# Patient Record
Sex: FEMALE | Race: BLACK OR AFRICAN AMERICAN | NOT HISPANIC OR LATINO
[De-identification: names, ages, dates, MRNs, and addresses within clinical notes are randomized per-mention and may not be internally consistent; named-entity substitution may affect disease eponyms.]

---

## 2018-03-23 ENCOUNTER — APPOINTMENT (OUTPATIENT)
Dept: OBGYN | Facility: CLINIC | Age: 63
End: 2018-03-23

## 2021-10-13 ENCOUNTER — INPATIENT (INPATIENT)
Facility: HOSPITAL | Age: 66
LOS: 9 days | Discharge: HOME | DRG: 345 | End: 2021-10-23
Payer: MEDICAID

## 2021-10-13 VITALS
WEIGHT: 190.04 LBS | OXYGEN SATURATION: 100 % | SYSTOLIC BLOOD PRESSURE: 132 MMHG | DIASTOLIC BLOOD PRESSURE: 84 MMHG | RESPIRATION RATE: 20 BRPM | HEART RATE: 87 BPM | TEMPERATURE: 98 F | HEIGHT: 64 IN

## 2021-10-13 DIAGNOSIS — Z96.652 PRESENCE OF LEFT ARTIFICIAL KNEE JOINT: Chronic | ICD-10-CM

## 2021-10-13 DIAGNOSIS — K56.41 FECAL IMPACTION: ICD-10-CM

## 2021-10-13 LAB
ALBUMIN SERPL ELPH-MCNC: 3.9 G/DL — SIGNIFICANT CHANGE UP (ref 3.3–5)
ALP SERPL-CCNC: 91 U/L — SIGNIFICANT CHANGE UP (ref 40–120)
ALT FLD-CCNC: 17 U/L — SIGNIFICANT CHANGE UP (ref 10–45)
ANION GAP SERPL CALC-SCNC: 17 MMOL/L — SIGNIFICANT CHANGE UP (ref 5–17)
APPEARANCE UR: CLEAR — SIGNIFICANT CHANGE UP
AST SERPL-CCNC: 12 U/L — SIGNIFICANT CHANGE UP (ref 10–40)
BACTERIA # UR AUTO: NEGATIVE — SIGNIFICANT CHANGE UP
BASE EXCESS BLDV CALC-SCNC: -0.7 MMOL/L — SIGNIFICANT CHANGE UP (ref -2–2)
BASOPHILS # BLD AUTO: 0.04 K/UL — SIGNIFICANT CHANGE UP (ref 0–0.2)
BASOPHILS NFR BLD AUTO: 0.3 % — SIGNIFICANT CHANGE UP (ref 0–2)
BILIRUB SERPL-MCNC: 0.3 MG/DL — SIGNIFICANT CHANGE UP (ref 0.2–1.2)
BILIRUB UR-MCNC: ABNORMAL
BUN SERPL-MCNC: 12 MG/DL — SIGNIFICANT CHANGE UP (ref 7–23)
CA-I SERPL-SCNC: 1.17 MMOL/L — SIGNIFICANT CHANGE UP (ref 1.15–1.33)
CALCIUM SERPL-MCNC: 8.9 MG/DL — SIGNIFICANT CHANGE UP (ref 8.4–10.5)
CHLORIDE BLDV-SCNC: 102 MMOL/L — SIGNIFICANT CHANGE UP (ref 96–108)
CHLORIDE SERPL-SCNC: 100 MMOL/L — SIGNIFICANT CHANGE UP (ref 96–108)
CO2 BLDV-SCNC: 27 MMOL/L — HIGH (ref 22–26)
CO2 SERPL-SCNC: 22 MMOL/L — SIGNIFICANT CHANGE UP (ref 22–31)
COD CRY URNS QL: ABNORMAL
COLOR SPEC: YELLOW — SIGNIFICANT CHANGE UP
COMMENT - URINE: SIGNIFICANT CHANGE UP
CREAT SERPL-MCNC: 0.65 MG/DL — SIGNIFICANT CHANGE UP (ref 0.5–1.3)
DIFF PNL FLD: NEGATIVE — SIGNIFICANT CHANGE UP
EOSINOPHIL # BLD AUTO: 0.39 K/UL — SIGNIFICANT CHANGE UP (ref 0–0.5)
EOSINOPHIL NFR BLD AUTO: 3.3 % — SIGNIFICANT CHANGE UP (ref 0–6)
EPI CELLS # UR: 3 /HPF — SIGNIFICANT CHANGE UP
GAS PNL BLDV: 138 MMOL/L — SIGNIFICANT CHANGE UP (ref 136–145)
GAS PNL BLDV: SIGNIFICANT CHANGE UP
GAS PNL BLDV: SIGNIFICANT CHANGE UP
GLUCOSE BLDV-MCNC: 94 MG/DL — SIGNIFICANT CHANGE UP (ref 70–99)
GLUCOSE SERPL-MCNC: 94 MG/DL — SIGNIFICANT CHANGE UP (ref 70–99)
GLUCOSE UR QL: NEGATIVE — SIGNIFICANT CHANGE UP
HCO3 BLDV-SCNC: 25 MMOL/L — SIGNIFICANT CHANGE UP (ref 22–29)
HCT VFR BLD CALC: 36.3 % — SIGNIFICANT CHANGE UP (ref 34.5–45)
HCT VFR BLDA CALC: 34 % — LOW (ref 34.5–46.5)
HGB BLD CALC-MCNC: 11.4 G/DL — LOW (ref 11.7–16.1)
HGB BLD-MCNC: 11.1 G/DL — LOW (ref 11.5–15.5)
HYALINE CASTS # UR AUTO: 5 /LPF — HIGH (ref 0–2)
IMM GRANULOCYTES NFR BLD AUTO: 0.7 % — SIGNIFICANT CHANGE UP (ref 0–1.5)
KETONES UR-MCNC: ABNORMAL
LACTATE BLDV-MCNC: 1 MMOL/L — SIGNIFICANT CHANGE UP (ref 0.7–2)
LEUKOCYTE ESTERASE UR-ACNC: NEGATIVE — SIGNIFICANT CHANGE UP
LIDOCAIN IGE QN: 19 U/L — SIGNIFICANT CHANGE UP (ref 7–60)
LYMPHOCYTES # BLD AUTO: 19.7 % — SIGNIFICANT CHANGE UP (ref 13–44)
LYMPHOCYTES # BLD AUTO: 2.35 K/UL — SIGNIFICANT CHANGE UP (ref 1–3.3)
MCHC RBC-ENTMCNC: 25.5 PG — LOW (ref 27–34)
MCHC RBC-ENTMCNC: 30.6 GM/DL — LOW (ref 32–36)
MCV RBC AUTO: 83.4 FL — SIGNIFICANT CHANGE UP (ref 80–100)
MONOCYTES # BLD AUTO: 1.28 K/UL — HIGH (ref 0–0.9)
MONOCYTES NFR BLD AUTO: 10.7 % — SIGNIFICANT CHANGE UP (ref 2–14)
NEUTROPHILS # BLD AUTO: 7.8 K/UL — HIGH (ref 1.8–7.4)
NEUTROPHILS NFR BLD AUTO: 65.3 % — SIGNIFICANT CHANGE UP (ref 43–77)
NITRITE UR-MCNC: NEGATIVE — SIGNIFICANT CHANGE UP
NRBC # BLD: 0 /100 WBCS — SIGNIFICANT CHANGE UP (ref 0–0)
PCO2 BLDV: 47 MMHG — HIGH (ref 39–42)
PH BLDV: 7.34 — SIGNIFICANT CHANGE UP (ref 7.32–7.43)
PH UR: 6 — SIGNIFICANT CHANGE UP (ref 5–8)
PLATELET # BLD AUTO: 392 K/UL — SIGNIFICANT CHANGE UP (ref 150–400)
PO2 BLDV: 21 MMHG — LOW (ref 25–45)
POTASSIUM BLDV-SCNC: 3.6 MMOL/L — SIGNIFICANT CHANGE UP (ref 3.5–5.1)
POTASSIUM SERPL-MCNC: 3.5 MMOL/L — SIGNIFICANT CHANGE UP (ref 3.5–5.3)
POTASSIUM SERPL-SCNC: 3.5 MMOL/L — SIGNIFICANT CHANGE UP (ref 3.5–5.3)
PROT SERPL-MCNC: 7.4 G/DL — SIGNIFICANT CHANGE UP (ref 6–8.3)
PROT UR-MCNC: 100 — SIGNIFICANT CHANGE UP
RBC # BLD: 4.35 M/UL — SIGNIFICANT CHANGE UP (ref 3.8–5.2)
RBC # FLD: 14.1 % — SIGNIFICANT CHANGE UP (ref 10.3–14.5)
RBC CASTS # UR COMP ASSIST: 5 /HPF — HIGH (ref 0–4)
SAO2 % BLDV: 34.2 % — LOW (ref 67–88)
SODIUM SERPL-SCNC: 139 MMOL/L — SIGNIFICANT CHANGE UP (ref 135–145)
SP GR SPEC: 1.04 — HIGH (ref 1.01–1.02)
UROBILINOGEN FLD QL: ABNORMAL
WBC # BLD: 11.94 K/UL — HIGH (ref 3.8–10.5)
WBC # FLD AUTO: 11.94 K/UL — HIGH (ref 3.8–10.5)
WBC UR QL: 2 /HPF — SIGNIFICANT CHANGE UP (ref 0–5)

## 2021-10-13 PROCEDURE — 99221 1ST HOSP IP/OBS SF/LOW 40: CPT | Mod: GC

## 2021-10-13 PROCEDURE — 74177 CT ABD & PELVIS W/CONTRAST: CPT | Mod: 26,MA

## 2021-10-13 PROCEDURE — 99285 EMERGENCY DEPT VISIT HI MDM: CPT

## 2021-10-13 RX ORDER — MINERAL OIL
133 OIL (ML) MISCELLANEOUS ONCE
Refills: 0 | Status: DISCONTINUED | OUTPATIENT
Start: 2021-10-13 | End: 2021-10-17

## 2021-10-13 RX ORDER — METRONIDAZOLE 500 MG
500 TABLET ORAL EVERY 8 HOURS
Refills: 0 | Status: DISCONTINUED | OUTPATIENT
Start: 2021-10-14 | End: 2021-10-17

## 2021-10-13 RX ORDER — FAMOTIDINE 10 MG/ML
20 INJECTION INTRAVENOUS ONCE
Refills: 0 | Status: COMPLETED | OUTPATIENT
Start: 2021-10-13 | End: 2021-10-13

## 2021-10-13 RX ORDER — CIPROFLOXACIN LACTATE 400MG/40ML
VIAL (ML) INTRAVENOUS
Refills: 0 | Status: DISCONTINUED | OUTPATIENT
Start: 2021-10-14 | End: 2021-10-17

## 2021-10-13 RX ORDER — SODIUM CHLORIDE 9 MG/ML
1000 INJECTION, SOLUTION INTRAVENOUS
Refills: 0 | Status: DISCONTINUED | OUTPATIENT
Start: 2021-10-13 | End: 2021-10-14

## 2021-10-13 RX ORDER — SUCRALFATE 1 G
1 TABLET ORAL ONCE
Refills: 0 | Status: COMPLETED | OUTPATIENT
Start: 2021-10-13 | End: 2021-10-13

## 2021-10-13 RX ORDER — ENOXAPARIN SODIUM 100 MG/ML
40 INJECTION SUBCUTANEOUS EVERY 24 HOURS
Refills: 0 | Status: DISCONTINUED | OUTPATIENT
Start: 2021-10-13 | End: 2021-10-17

## 2021-10-13 RX ORDER — SODIUM CHLORIDE 9 MG/ML
1000 INJECTION INTRAMUSCULAR; INTRAVENOUS; SUBCUTANEOUS ONCE
Refills: 0 | Status: COMPLETED | OUTPATIENT
Start: 2021-10-13 | End: 2021-10-13

## 2021-10-13 RX ORDER — CIPROFLOXACIN LACTATE 400MG/40ML
400 VIAL (ML) INTRAVENOUS EVERY 12 HOURS
Refills: 0 | Status: DISCONTINUED | OUTPATIENT
Start: 2021-10-14 | End: 2021-10-17

## 2021-10-13 RX ORDER — POLYETHYLENE GLYCOL 3350 17 G/17G
17 POWDER, FOR SOLUTION ORAL DAILY
Refills: 0 | Status: DISCONTINUED | OUTPATIENT
Start: 2021-10-13 | End: 2021-10-17

## 2021-10-13 RX ORDER — METRONIDAZOLE 500 MG
500 TABLET ORAL ONCE
Refills: 0 | Status: COMPLETED | OUTPATIENT
Start: 2021-10-13 | End: 2021-10-14

## 2021-10-13 RX ORDER — METRONIDAZOLE 500 MG
TABLET ORAL
Refills: 0 | Status: DISCONTINUED | OUTPATIENT
Start: 2021-10-14 | End: 2021-10-17

## 2021-10-13 RX ORDER — SENNA PLUS 8.6 MG/1
2 TABLET ORAL AT BEDTIME
Refills: 0 | Status: DISCONTINUED | OUTPATIENT
Start: 2021-10-13 | End: 2021-10-17

## 2021-10-13 RX ORDER — CIPROFLOXACIN LACTATE 400MG/40ML
400 VIAL (ML) INTRAVENOUS ONCE
Refills: 0 | Status: COMPLETED | OUTPATIENT
Start: 2021-10-13 | End: 2021-10-14

## 2021-10-13 RX ADMIN — Medication 1 GRAM(S): at 19:35

## 2021-10-13 RX ADMIN — SODIUM CHLORIDE 1000 MILLILITER(S): 9 INJECTION INTRAMUSCULAR; INTRAVENOUS; SUBCUTANEOUS at 17:51

## 2021-10-13 RX ADMIN — FAMOTIDINE 20 MILLIGRAM(S): 10 INJECTION INTRAVENOUS at 19:40

## 2021-10-13 NOTE — ED ADULT NURSE NOTE - NS PRO PASSIVE SMOKE EXP
How Severe Is Your Skin Lesion?: mild Have Your Skin Lesions Been Treated?: not been treated Is This A New Presentation, Or A Follow-Up?: Growths No

## 2021-10-13 NOTE — ED ADULT NURSE NOTE - OBJECTIVE STATEMENT
65 y.o. female coming in from home for abdominal pain x 3 weeks. pt states that she started having abdominal pain about 3 weeks ago but it got worse today and her PCP told her to come into the ER. pt denies any PMH. A&Ox3, vital signs stable. lung sounds clear bilaterally, abdominal tenderness on palpation, bowel sounds present, no lower extremity edema noted. pt denies any chest pain, SOB, weakness, dizziness. pt denies any abnormal/black/loose stools. bed in lowest position,
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
## 2021-10-13 NOTE — H&P ADULT - ASSESSMENT
65F on daily calcium 1200 coming into the ED presenting with 3 weeks abdominal pain, constipation, and distension.      Admit to Dr. Ngo  NPO/IVF  Cipro/Flagyl  Fleet enemas  Monitor for BM  Discussed with Chief resident on call    Colorectal Surgery  [facility] [id number]   65F on daily calcium 1200 coming into the ED presenting with 3 weeks abdominal pain, constipation, and distension.      Admit to Dr. Ngo  NPO/IVF  Cipro/Flagyl  Hypaque Enema today  Monitor for BM  Discussed with Dr. Ngo    Colorectal Surgery  [facility] [id number]

## 2021-10-13 NOTE — ED PROVIDER NOTE - PROGRESS NOTE DETAILS
Attending Thalia: calcified rectal mass on CT. Spoke w/ surgery who will come eval pt Candy CUELLAR PGY-2: spoke with surgery, who will admit patient to service.

## 2021-10-13 NOTE — ED PROVIDER NOTE - ATTENDING CONTRIBUTION TO CARE
Attending Thalia: I performed a history and physical exam of the patient and discussed their management with the resident/fellow/ACP/student. I have reviewed the resident/fellow/ACP/student note and agree with the documented findings and plan of care, except as noted. My medical decision making and observations are found above. Please refer to any progress notes for updates on clinical course.

## 2021-10-13 NOTE — H&P ADULT - ATTENDING COMMENTS
Patient appears to have calcified stool that was previously in the cecum and is now partially obstructing in the rectosigmoid.  This is most likely related to the patient's high oral calcium intake.  Abdomen is soft and nontender.  Will try water-soluble contrast enema to help the stool pass.  If that is unsuccessful, advanced GI consulted for possible endoscopic decompression.  If nonoperative means to get stool to pass are unsuccessful then surgery will be needed.

## 2021-10-13 NOTE — ED PROVIDER NOTE - CLINICAL SUMMARY MEDICAL DECISION MAKING FREE TEXT BOX
Attending Thalia: 64 y/o F w/ PMH of HLD presenting w/ abdominal pain. Seen in [facility]. Pt reporting for the past 2 weeks having generalized abdominal pain. Worse in the morning. Worse w/ eating. No specific alleviating factors. Associated w/ bloating feeling. Has had change in bowel movements during this time as well, only passing small hard stools. Prior to this episode had been having normal bowel movements. Trying OTC meds including advil and culturelle which has been helping somewhat with the pain. No nausea, vomiting, fevers, chills, urinary symptoms. Saw her new PCP yesterday and showed her CT report from past December 2020 and PCP told pt to come to ED for eval. Last colonoscopy 4-5 years ago which she reports is normal. On exam pt well appearing, no apparent distress. Lungs clear, HR regular. Abd nondistended/soft/nontender. Possible inflammatory bowel condition, but atypical in older pt w/o prior conditions. Will get CT a/p w/ PO and IV contrast to eval for malignancy. Low suspicion for acute surgical abdominal pathology. Pt would also benefit from outpatient colonoscopy, which she agreed to. Plan for labs, imaging, IVF, meds PRN (pt denying need for pain meds at initial exam). Will reassess the need for additional interventions as clinically warranted.

## 2021-10-13 NOTE — ED PROVIDER NOTE - OBJECTIVE STATEMENT
65yof pmhx of diet controlled HLD here with 2 weeks of constant lower abd pain with constipation. pt reports persistent pain, tried multiple over the counter meds without relief. Last colonoscopy 5 years ago. had a CT pelvis non cont in Dec with ?non specific

## 2021-10-13 NOTE — ED PROVIDER NOTE - NS ED ROS FT
Attending Nello:   GENERAL: No fever or chills  EYES: No change in vision  HEENT: No trouble swallowing or speaking  CARDIAC: No chest pain  PULMONARY: No cough or SOB  GI: +abd pain, constipation  : No changes in urination  SKIN: No rashes  NEURO: No headache, no numbness  MSK: No joint pain  Otherwise as HPI or negative.

## 2021-10-13 NOTE — H&P ADULT - NSHPLABSRESULTS_GEN_ALL_CORE
LABS:                          11.1   11.94 )-----------( 392      ( 13 Oct 2021 16:25 )             36.3     10-13    139  |  100  |  12  ----------------------------<  94  3.5   |  22  |  0.65    Ca    8.9      13 Oct 2021 16:25    TPro  7.4  /  Alb  3.9  /  TBili  0.3  /  DBili  x   /  AST  12  /  ALT  17  /  AlkPhos  91  10-13      < from: CT Abdomen and Pelvis w/ Oral Cont and w/ IV Cont (10.13.21 @ 18:02) >    FINDINGS:  LOWER CHEST: Within normal limits.    LIVER: Liver cysts and subcentimeter hypodensities too small to characterize.  BILE DUCTS: Normal caliber.  GALLBLADDER: Within normal limits.  SPLEEN: Within normal limits.  PANCREAS: Within normal limits.  ADRENALS: Within normal limits.  KIDNEYS/URETERS: Left renal cyst.    BLADDER: Within normal limits.  REPRODUCTIVE ORGANS: Uterus and adnexa within normal limits.    BOWEL: A 4.4 x 3.9 cm peripherally calcified lesion within the rectosigmoid colon with surrounding fat stranding. Moderate colonic stool burden to the level of this lesion and there is bowel thickening proximal to the lesion. No stool noted distal to the peripherally calcified lesion. Appendix is normal.  PERITONEUM: No ascites.  VESSELS: Within normal limits.  RETROPERITONEUM/LYMPH NODES: Prominent retroperitoneal and pelvic lymph nodes. Enlarged left obturator lymph node measuring 1.6 x 1.0 cm (3:94).  ABDOMINAL WALL: Fat-containing umbilical hernia.  BONES: Within normal limits.    IMPRESSION:  Indeterminate calcified mass in the rectosigmoid colon with surrounding inflammation and constipation. The differential considerations include fecalith, foreign body, or tumor.    < end of copied text >

## 2021-10-13 NOTE — H&P ADULT - NSHPPHYSICALEXAM_GEN_ALL_CORE
Vital Signs Last 24 Hrs  T(C): 36.8 (13 Oct 2021 19:30), Max: 36.8 (13 Oct 2021 19:30)  T(F): 98.2 (13 Oct 2021 19:30), Max: 98.2 (13 Oct 2021 19:30)  HR: 90 (13 Oct 2021 19:30) (83 - 90)  BP: 146/75 (13 Oct 2021 19:30) (117/74 - 146/75)  BP(mean): 99 (13 Oct 2021 19:30) (99 - 99)  RR: 18 (13 Oct 2021 19:30) (18 - 20)  SpO2: 99% (13 Oct 2021 19:30) (98% - 100%)    Gen: NAD, pleasant  HEENT: NCAT, EOMI  Resp: B/l chest rise, no increased work of breathing  ABD: Distended, not tympanic, Slightly tender in lower mid abdomen, no rebound or guarding  Rectal: rectal vault empty, no blood on glove

## 2021-10-13 NOTE — ED ADULT NURSE REASSESSMENT NOTE - NS ED NURSE REASSESS COMMENT FT1
RN went in to patient room to medicate patient, patient in restroom. RN to return to room to medicate patient when patient returns from restroom.

## 2021-10-13 NOTE — ED PROVIDER NOTE - PHYSICAL EXAMINATION
Attending Nello: Gen: NAD, AOx3, able to make needs known, non-toxic  Head: NCAT  HEENT: EOMI, oral mucosa moist, normal conjunctiva  Lung: CTAB, no respiratory distress, no wheezes/rhonchi/rales B/L, speaking in full sentences  CV: RRR, no murmurs  Abd: no distended, soft, nontender, no guarding, no CVA tenderness  MSK: no visible deformities  Neuro: Appears non focal  Skin: Warm, well perfused  Psych: normal affect

## 2021-10-14 LAB
ANION GAP SERPL CALC-SCNC: 16 MMOL/L — SIGNIFICANT CHANGE UP (ref 5–17)
APTT BLD: 21.9 SEC — LOW (ref 27.5–35.5)
BLD GP AB SCN SERPL QL: POSITIVE — SIGNIFICANT CHANGE UP
BUN SERPL-MCNC: 8 MG/DL — SIGNIFICANT CHANGE UP (ref 7–23)
CALCIUM SERPL-MCNC: 8.6 MG/DL — SIGNIFICANT CHANGE UP (ref 8.4–10.5)
CHLORIDE SERPL-SCNC: 102 MMOL/L — SIGNIFICANT CHANGE UP (ref 96–108)
CO2 SERPL-SCNC: 21 MMOL/L — LOW (ref 22–31)
CREAT SERPL-MCNC: 0.58 MG/DL — SIGNIFICANT CHANGE UP (ref 0.5–1.3)
GLUCOSE SERPL-MCNC: 77 MG/DL — SIGNIFICANT CHANGE UP (ref 70–99)
HCT VFR BLD CALC: 31.7 % — LOW (ref 34.5–45)
HCV AB S/CO SERPL IA: 0.14 S/CO — SIGNIFICANT CHANGE UP (ref 0–0.99)
HCV AB SERPL-IMP: SIGNIFICANT CHANGE UP
HGB BLD-MCNC: 9.9 G/DL — LOW (ref 11.5–15.5)
INR BLD: 1.29 RATIO — HIGH (ref 0.88–1.16)
MAGNESIUM SERPL-MCNC: 2 MG/DL — SIGNIFICANT CHANGE UP (ref 1.6–2.6)
MCHC RBC-ENTMCNC: 25.8 PG — LOW (ref 27–34)
MCHC RBC-ENTMCNC: 31.2 GM/DL — LOW (ref 32–36)
MCV RBC AUTO: 82.8 FL — SIGNIFICANT CHANGE UP (ref 80–100)
NRBC # BLD: 0 /100 WBCS — SIGNIFICANT CHANGE UP (ref 0–0)
PHOSPHATE SERPL-MCNC: 3.2 MG/DL — SIGNIFICANT CHANGE UP (ref 2.5–4.5)
PLATELET # BLD AUTO: 354 K/UL — SIGNIFICANT CHANGE UP (ref 150–400)
POTASSIUM SERPL-MCNC: 3.5 MMOL/L — SIGNIFICANT CHANGE UP (ref 3.5–5.3)
POTASSIUM SERPL-SCNC: 3.5 MMOL/L — SIGNIFICANT CHANGE UP (ref 3.5–5.3)
PROTHROM AB SERPL-ACNC: 15.3 SEC — HIGH (ref 10.6–13.6)
RBC # BLD: 3.83 M/UL — SIGNIFICANT CHANGE UP (ref 3.8–5.2)
RBC # FLD: 14.1 % — SIGNIFICANT CHANGE UP (ref 10.3–14.5)
RH IG SCN BLD-IMP: POSITIVE — SIGNIFICANT CHANGE UP
SARS-COV-2 RNA SPEC QL NAA+PROBE: SIGNIFICANT CHANGE UP
SODIUM SERPL-SCNC: 139 MMOL/L — SIGNIFICANT CHANGE UP (ref 135–145)
WBC # BLD: 10.15 K/UL — SIGNIFICANT CHANGE UP (ref 3.8–10.5)
WBC # FLD AUTO: 10.15 K/UL — SIGNIFICANT CHANGE UP (ref 3.8–10.5)

## 2021-10-14 PROCEDURE — 99253 IP/OBS CNSLTJ NEW/EST LOW 45: CPT | Mod: GC

## 2021-10-14 PROCEDURE — 74270 X-RAY XM COLON 1CNTRST STD: CPT | Mod: 26

## 2021-10-14 PROCEDURE — 86077 PHYS BLOOD BANK SERV XMATCH: CPT

## 2021-10-14 RX ORDER — HYDROMORPHONE HYDROCHLORIDE 2 MG/ML
0.25 INJECTION INTRAMUSCULAR; INTRAVENOUS; SUBCUTANEOUS ONCE
Refills: 0 | Status: DISCONTINUED | OUTPATIENT
Start: 2021-10-14 | End: 2021-10-17

## 2021-10-14 RX ORDER — HYDROMORPHONE HYDROCHLORIDE 2 MG/ML
0.25 INJECTION INTRAMUSCULAR; INTRAVENOUS; SUBCUTANEOUS ONCE
Refills: 0 | Status: DISCONTINUED | OUTPATIENT
Start: 2021-10-14 | End: 2021-10-14

## 2021-10-14 RX ORDER — POTASSIUM CHLORIDE 20 MEQ
10 PACKET (EA) ORAL
Refills: 0 | Status: COMPLETED | OUTPATIENT
Start: 2021-10-14 | End: 2021-10-14

## 2021-10-14 RX ORDER — ACETAMINOPHEN 500 MG
1000 TABLET ORAL ONCE
Refills: 0 | Status: COMPLETED | OUTPATIENT
Start: 2021-10-14 | End: 2021-10-14

## 2021-10-14 RX ORDER — ACETAMINOPHEN 500 MG
975 TABLET ORAL ONCE
Refills: 0 | Status: COMPLETED | OUTPATIENT
Start: 2021-10-14 | End: 2021-10-14

## 2021-10-14 RX ORDER — DEXTROSE MONOHYDRATE, SODIUM CHLORIDE, AND POTASSIUM CHLORIDE 50; .745; 4.5 G/1000ML; G/1000ML; G/1000ML
1000 INJECTION, SOLUTION INTRAVENOUS
Refills: 0 | Status: DISCONTINUED | OUTPATIENT
Start: 2021-10-14 | End: 2021-10-17

## 2021-10-14 RX ORDER — KETOROLAC TROMETHAMINE 30 MG/ML
15 SYRINGE (ML) INJECTION ONCE
Refills: 0 | Status: DISCONTINUED | OUTPATIENT
Start: 2021-10-14 | End: 2021-10-14

## 2021-10-14 RX ADMIN — Medication 200 MILLIGRAM(S): at 22:30

## 2021-10-14 RX ADMIN — Medication 100 MILLIGRAM(S): at 16:49

## 2021-10-14 RX ADMIN — SENNA PLUS 2 TABLET(S): 8.6 TABLET ORAL at 22:30

## 2021-10-14 RX ADMIN — Medication 650 MILLIGRAM(S): at 01:52

## 2021-10-14 RX ADMIN — Medication 15 MILLIGRAM(S): at 23:23

## 2021-10-14 RX ADMIN — Medication 100 MILLIGRAM(S): at 00:35

## 2021-10-14 RX ADMIN — Medication 100 MILLIEQUIVALENT(S): at 15:29

## 2021-10-14 RX ADMIN — Medication 1000 MILLIGRAM(S): at 20:05

## 2021-10-14 RX ADMIN — DEXTROSE MONOHYDRATE, SODIUM CHLORIDE, AND POTASSIUM CHLORIDE 100 MILLILITER(S): 50; .745; 4.5 INJECTION, SOLUTION INTRAVENOUS at 15:31

## 2021-10-14 RX ADMIN — Medication 15 MILLIGRAM(S): at 23:53

## 2021-10-14 RX ADMIN — ENOXAPARIN SODIUM 40 MILLIGRAM(S): 100 INJECTION SUBCUTANEOUS at 04:53

## 2021-10-14 RX ADMIN — Medication 100 MILLIGRAM(S): at 08:34

## 2021-10-14 RX ADMIN — SODIUM CHLORIDE 120 MILLILITER(S): 9 INJECTION, SOLUTION INTRAVENOUS at 04:53

## 2021-10-14 RX ADMIN — Medication 400 MILLIGRAM(S): at 19:35

## 2021-10-14 RX ADMIN — HYDROMORPHONE HYDROCHLORIDE 0.25 MILLIGRAM(S): 2 INJECTION INTRAMUSCULAR; INTRAVENOUS; SUBCUTANEOUS at 05:27

## 2021-10-14 RX ADMIN — Medication 100 MILLIEQUIVALENT(S): at 19:32

## 2021-10-14 RX ADMIN — Medication 200 MILLIGRAM(S): at 11:53

## 2021-10-14 RX ADMIN — Medication 200 MILLIGRAM(S): at 01:53

## 2021-10-14 RX ADMIN — DEXTROSE MONOHYDRATE, SODIUM CHLORIDE, AND POTASSIUM CHLORIDE 100 MILLILITER(S): 50; .745; 4.5 INJECTION, SOLUTION INTRAVENOUS at 19:33

## 2021-10-14 RX ADMIN — HYDROMORPHONE HYDROCHLORIDE 0.25 MILLIGRAM(S): 2 INJECTION INTRAMUSCULAR; INTRAVENOUS; SUBCUTANEOUS at 05:13

## 2021-10-14 NOTE — ED ADULT NURSE REASSESSMENT NOTE - NS ED NURSE REASSESS COMMENT FT1
Patient to RN "I feel you are not compassionate and do not care about me. You have not checked on me to make sure I am okay after I told you I was in pain." Patient made aware RN offered tylenol but patient declined, Rn attempted to contact admitting team but given pager [phone] by MAR but pager not a working number. RN listened to patient, apologized for patient feeling she was not receiving quality care, suggested RN bring over manager. JACK Morales brought to patient's room, patient states she understands that she is waiting for a bed assignment, thanked the JACK and RN for their help, requests 2 tylenol for her pain, made comfortable in stretcher, and aware waiting for bed assignment.

## 2021-10-14 NOTE — PROGRESS NOTE ADULT - SUBJECTIVE AND OBJECTIVE BOX
TEAM Surgery Progress Note    INTERVAL EVENTS: No acute events overnight.  SUBJECTIVE: Patient seen and examined at bedside with surgical team.    OBJECTIVE:    Vital Signs Last 24 Hrs  T(C): 36.9 (13 Oct 2021 23:30), Max: 36.9 (13 Oct 2021 23:30)  T(F): 98.5 (13 Oct 2021 23:30), Max: 98.5 (13 Oct 2021 23:30)  HR: 79 (13 Oct 2021 23:30) (79 - 90)  BP: 137/80 (13 Oct 2021 23:30) (117/74 - 146/75)  BP(mean): 99 (13 Oct 2021 23:30) (99 - 99)  RR: 18 (13 Oct 2021 23:30) (18 - 20)  SpO2: 98% (13 Oct 2021 23:30) (98% - 100%)I&O's Detail  MEDICATIONS  (STANDING):  ciprofloxacin   IVPB 400 milliGRAM(s) IV Intermittent every 12 hours  ciprofloxacin   IVPB      enoxaparin Injectable 40 milliGRAM(s) SubCutaneous every 24 hours  lactated ringers. 1000 milliLiter(s) (120 mL/Hr) IV Continuous <Continuous>  metroNIDAZOLE  IVPB      metroNIDAZOLE  IVPB 500 milliGRAM(s) IV Intermittent every 8 hours  mineral oil enema 133 milliLiter(s) Rectal once  polyethylene glycol 3350 17 Gram(s) Oral daily  senna 2 Tablet(s) Oral at bedtime    MEDICATIONS  (PRN):      PHYSICAL EXAM:  Gen: NAD, pleasant  HEENT: NCAT, EOMI  Resp: B/l chest rise, no increased work of breathing  ABD: Distended, not tympanic, Slightly tender in lower mid abdomen, no rebound or guarding  Rectal: rectal vault empty, no blood on glove    LABS:                        11.1   11.94 )-----------( 392      ( 13 Oct 2021 16:25 )             36.3     10-13    139  |  100  |  12  ----------------------------<  94  3.5   |  22  |  0.65    Ca    8.9      13 Oct 2021 16:25    TPro  7.4  /  Alb  3.9  /  TBili  0.3  /  DBili  x   /  AST  12  /  ALT  17  /  AlkPhos  91  10-13    PT/INR - ( 13 Oct 2021 23:44 )   PT: 15.3 sec;   INR: 1.29 ratio         PTT - ( 13 Oct 2021 23:44 )  PTT:21.9 sec  LIVER FUNCTIONS - ( 13 Oct 2021 16:25 )  Alb: 3.9 g/dL / Pro: 7.4 g/dL / ALK PHOS: 91 U/L / ALT: 17 U/L / AST: 12 U/L / GGT: x           Urinalysis Basic - ( 13 Oct 2021 16:29 )    Color: Yellow / Appearance: Clear / S.038 / pH: x  Gluc: x / Ketone: Large  / Bili: Small / Urobili: 2 mg/dL   Blood: x / Protein: 100 / Nitrite: Negative   Leuk Esterase: Negative / RBC: 5 /hpf / WBC 2 /HPF   Sq Epi: x / Non Sq Epi: 3 /hpf / Bacteria: Negative      ABO Interpretation: B (10-13-21 @ 23:39)      IMAGING:     TEAM Surgery Progress Note    INTERVAL EVENTS: No acute events overnight.  SUBJECTIVE: Patient seen and examined at bedside with surgical team. Patient denies acute onset abdominal pain, fevers, chills, SOB, CP, lightheadedness    OBJECTIVE:    Vital Signs Last 24 Hrs  T(C): 36.9 (13 Oct 2021 23:30), Max: 36.9 (13 Oct 2021 23:30)  T(F): 98.5 (13 Oct 2021 23:30), Max: 98.5 (13 Oct 2021 23:30)  HR: 79 (13 Oct 2021 23:30) (79 - 90)  BP: 137/80 (13 Oct 2021 23:30) (117/74 - 146/75)  BP(mean): 99 (13 Oct 2021 23:30) (99 - 99)  RR: 18 (13 Oct 2021 23:30) (18 - 20)  SpO2: 98% (13 Oct 2021 23:30) (98% - 100%)I&O's Detail  MEDICATIONS  (STANDING):  ciprofloxacin   IVPB 400 milliGRAM(s) IV Intermittent every 12 hours  ciprofloxacin   IVPB      enoxaparin Injectable 40 milliGRAM(s) SubCutaneous every 24 hours  lactated ringers. 1000 milliLiter(s) (120 mL/Hr) IV Continuous <Continuous>  metroNIDAZOLE  IVPB      metroNIDAZOLE  IVPB 500 milliGRAM(s) IV Intermittent every 8 hours  mineral oil enema 133 milliLiter(s) Rectal once  polyethylene glycol 3350 17 Gram(s) Oral daily  senna 2 Tablet(s) Oral at bedtime    MEDICATIONS  (PRN):      PHYSICAL EXAM:  Gen: NAD, pleasant  HEENT: NCAT, EOMI  Resp: B/l chest rise, no increased work of breathing  ABD: Distended, not tympanic, Slightly tender in lower mid abdomen, no rebound or guarding  Rectal: rectal vault empty, no blood on glove    LABS:                        11.1   11.94 )-----------( 392      ( 13 Oct 2021 16:25 )             36.3     10-13    139  |  100  |  12  ----------------------------<  94  3.5   |  22  |  0.65    Ca    8.9      13 Oct 2021 16:25    TPro  7.4  /  Alb  3.9  /  TBili  0.3  /  DBili  x   /  AST  12  /  ALT  17  /  AlkPhos  91  10-    PT/INR - ( 13 Oct 2021 23:44 )   PT: 15.3 sec;   INR: 1.29 ratio         PTT - ( 13 Oct 2021 23:44 )  PTT:21.9 sec  LIVER FUNCTIONS - ( 13 Oct 2021 16:25 )  Alb: 3.9 g/dL / Pro: 7.4 g/dL / ALK PHOS: 91 U/L / ALT: 17 U/L / AST: 12 U/L / GGT: x           Urinalysis Basic - ( 13 Oct 2021 16:29 )    Color: Yellow / Appearance: Clear / S.038 / pH: x  Gluc: x / Ketone: Large  / Bili: Small / Urobili: 2 mg/dL   Blood: x / Protein: 100 / Nitrite: Negative   Leuk Esterase: Negative / RBC: 5 /hpf / WBC 2 /HPF   Sq Epi: x / Non Sq Epi: 3 /hpf / Bacteria: Negative      ABO Interpretation: B (10-13-21 @ 23:39)      IMAGING:     TEAM Surgery Progress Note    INTERVAL EVENTS: No acute events overnight.  SUBJECTIVE: Patient seen and examined at bedside with surgical team. Pt thinks her last BM was 3 weeks ago. Has some lower quadrant pain. Not passing gas.     OBJECTIVE:    Vital Signs Last 24 Hrs  T(C): 36.9 (13 Oct 2021 23:30), Max: 36.9 (13 Oct 2021 23:30)  T(F): 98.5 (13 Oct 2021 23:30), Max: 98.5 (13 Oct 2021 23:30)  HR: 79 (13 Oct 2021 23:30) (79 - 90)  BP: 137/80 (13 Oct 2021 23:30) (117/74 - 146/75)  BP(mean): 99 (13 Oct 2021 23:30) (99 - 99)  RR: 18 (13 Oct 2021 23:30) (18 - 20)  SpO2: 98% (13 Oct 2021 23:30) (98% - 100%)I&O's Detail  MEDICATIONS  (STANDING):  ciprofloxacin   IVPB 400 milliGRAM(s) IV Intermittent every 12 hours  ciprofloxacin   IVPB      enoxaparin Injectable 40 milliGRAM(s) SubCutaneous every 24 hours  lactated ringers. 1000 milliLiter(s) (120 mL/Hr) IV Continuous <Continuous>  metroNIDAZOLE  IVPB      metroNIDAZOLE  IVPB 500 milliGRAM(s) IV Intermittent every 8 hours  mineral oil enema 133 milliLiter(s) Rectal once  polyethylene glycol 3350 17 Gram(s) Oral daily  senna 2 Tablet(s) Oral at bedtime    MEDICATIONS  (PRN):      PHYSICAL EXAM:  Gen: NAD, pleasant  Resp: B/l chest rise, no increased work of breathing  ABD: Distended, not tympanic, Slightly tender in lower mid abdomen, no rebound or guarding    LABS:                        11.1   11.94 )-----------( 392      ( 13 Oct 2021 16:25 )             36.3     10-13    139  |  100  |  12  ----------------------------<  94  3.5   |  22  |  0.65    Ca    8.9      13 Oct 2021 16:25    TPro  7.4  /  Alb  3.9  /  TBili  0.3  /  DBili  x   /  AST  12  /  ALT  17  /  AlkPhos  91  10-13    PT/INR - ( 13 Oct 2021 23:44 )   PT: 15.3 sec;   INR: 1.29 ratio         PTT - ( 13 Oct 2021 23:44 )  PTT:21.9 sec  LIVER FUNCTIONS - ( 13 Oct 2021 16:25 )  Alb: 3.9 g/dL / Pro: 7.4 g/dL / ALK PHOS: 91 U/L / ALT: 17 U/L / AST: 12 U/L / GGT: x           Urinalysis Basic - ( 13 Oct 2021 16:29 )    Color: Yellow / Appearance: Clear / S.038 / pH: x  Gluc: x / Ketone: Large  / Bili: Small / Urobili: 2 mg/dL   Blood: x / Protein: 100 / Nitrite: Negative   Leuk Esterase: Negative / RBC: 5 /hpf / WBC 2 /HPF   Sq Epi: x / Non Sq Epi: 3 /hpf / Bacteria: Negative      ABO Interpretation: B (10-13-21 @ 23:39)      IMAGING:

## 2021-10-14 NOTE — CONSULT NOTE ADULT - SUBJECTIVE AND OBJECTIVE BOX
Chief Complaint:  Patient is a 65y old  Female who presents with a chief complaint of Stercoral Colitis (14 Oct 2021 03:01)      HPI: Pt is a 64yo F PMH HLD presents with abdominal pain, constipation and distention x 3 weeks. She states that all her symptoms started around the same time.  She usually has bowel movements once a day and they are regular however 3 weeks a go she noticed a decreased in the frequency that she was having bowel movements and also states that they were very small and clumpy.  She tried using a fleet enema once and it did not help.  Recently she has only been passing water.  Denies any blood in the BM.  States her last colonoscopy was 4 years ago and she was told to follow up in 7-10 years as everything was normal.  She denies f/c/sob/cp.  Denies N/V.  She brought the report of a CT scan she had in 2020 which showed a calcified mass in her cecum.  She states that she takes 1200 of Ca daily as instructed by her orthopedic surgeon.         Allergies:  No Known Allergies      Home Medications:    Hospital Medications:  ciprofloxacin   IVPB 400 milliGRAM(s) IV Intermittent every 12 hours  ciprofloxacin   IVPB      enoxaparin Injectable 40 milliGRAM(s) SubCutaneous every 24 hours  lactated ringers. 1000 milliLiter(s) IV Continuous <Continuous>  metroNIDAZOLE  IVPB 500 milliGRAM(s) IV Intermittent every 8 hours  metroNIDAZOLE  IVPB      mineral oil enema 133 milliLiter(s) Rectal once  polyethylene glycol 3350 17 Gram(s) Oral daily  senna 2 Tablet(s) Oral at bedtime      PMHX/PSHX:  No pertinent past medical history    H/O total knee replacement, left        Family history:      Social History:     ROS: As per HPI, 14-point ROS negative otherwise.    General:  No wt loss, fevers, chills, night sweats, fatigue,   Eyes:  Good vision, no reported pain  ENT:  No sore throat, pain, runny nose, dysphagia  CV:  No pain, palpitations, hypo/hypertension  Resp:  No dyspnea, cough, tachypnea, wheezing  GI:  See HPI  :  No pain, bleeding, incontinence, nocturia  Muscle:  No pain, weakness  Neuro:  No weakness, tingling, memory problems  Psych:  No fatigue, insomnia, mood problems, depression  Endocrine:  No polyuria, polydipsia, cold/heat intolerance  Heme:  No petechiae, ecchymosis, easy bruisability  Skin:  No rash, edema      PHYSICAL EXAM:     Vital Signs:  Vital Signs Last 24 Hrs  T(C): 36.6 (14 Oct 2021 03:41), Max: 36.9 (13 Oct 2021 23:30)  T(F): 97.8 (14 Oct 2021 03:41), Max: 98.5 (13 Oct 2021 23:30)  HR: 74 (14 Oct 2021 03:41) (74 - 90)  BP: 128/82 (14 Oct 2021 03:41) (117/74 - 146/75)  BP(mean): 99 (13 Oct 2021 23:30) (99 - 99)  RR: 18 (14 Oct 2021 03:41) (18 - 20)  SpO2: 96% (14 Oct 2021 03:41) (96% - 100%)  Daily Height in cm: 162.56 (13 Oct 2021 13:29)    Daily     GENERAL:  Appears stated age, well-groomed, well-nourished, no distress  HEENT:  NC/AT,  conjunctivae clear and pink  CHEST:  Full & symmetric excursion, no increased effort  HEART:  Regular rhythm, no JVD  ABDOMEN:  Soft, tender, distended  EXTREMITIES:  no cyanosis, clubbing or edema  SKIN:  No rash/erythema/ecchymoses/petechiae/wounds/abscess/warm/dry  NEURO:  Alert, oriented, nonfocal    LABS:                        9.9    10.15 )-----------( 354      ( 14 Oct 2021 09:21 )             31.7     10-13    139  |  100  |  12  ----------------------------<  94  3.5   |  22  |  0.65    Ca    8.9      13 Oct 2021 16:25    TPro  7.4  /  Alb  3.9  /  TBili  0.3  /  DBili  x   /  AST  12  /  ALT  17  /  AlkPhos  91  10-13    LIVER FUNCTIONS - ( 13 Oct 2021 16:25 )  Alb: 3.9 g/dL / Pro: 7.4 g/dL / ALK PHOS: 91 U/L / ALT: 17 U/L / AST: 12 U/L / GGT: x           PT/INR - ( 13 Oct 2021 23:44 )   PT: 15.3 sec;   INR: 1.29 ratio         PTT - ( 13 Oct 2021 23:44 )  PTT:21.9 sec  Urinalysis Basic - ( 13 Oct 2021 16:29 )    Color: Yellow / Appearance: Clear / S.038 / pH: x  Gluc: x / Ketone: Large  / Bili: Small / Urobili: 2 mg/dL   Blood: x / Protein: 100 / Nitrite: Negative   Leuk Esterase: Negative / RBC: 5 /hpf / WBC 2 /HPF   Sq Epi: x / Non Sq Epi: 3 /hpf / Bacteria: Negative      Amylase Serum--      Lipase serum19       Ammonia--      Imaging:    < from: CT Abdomen and Pelvis w/ Oral Cont and w/ IV Cont (10.13.21 @ 18:02) >  FINDINGS:  LOWER CHEST: Within normal limits.    LIVER: Liver cysts and subcentimeter hypodensities too small to characterize.  BILE DUCTS: Normal caliber.  GALLBLADDER: Within normal limits.  SPLEEN: Within normal limits.  PANCREAS: Within normal limits.  ADRENALS: Within normal limits.  KIDNEYS/URETERS: Left renal cyst.    BLADDER: Within normal limits.  REPRODUCTIVE ORGANS: Uterus and adnexa within normal limits.    BOWEL: A 4.4 x 3.9 cm peripherally calcified lesion within the rectosigmoid colon with surrounding fat stranding. Moderate colonic stool burden to the level of this lesion and there is bowel thickening proximal to the lesion. No stool noted distal to the peripherally calcified lesion. Appendix is normal.  PERITONEUM: No ascites.  VESSELS: Within normal limits.  RETROPERITONEUM/LYMPH NODES: Prominent retroperitoneal and pelvic lymph nodes. Enlarged left obturator lymph node measuring 1.6 x 1.0 cm (3:94).  ABDOMINAL WALL: Fat-containing umbilical hernia.  BONES: Within normal limits.    IMPRESSION:  Indeterminate calcified mass in the rectosigmoid colon with surrounding inflammation and constipation. The differential considerations include fecalith, foreign body, or tumor.    Findings were discussed with Dr. HARRY LOZANO 10/13/2021 7:12 PM with read back confirmation.    --- End of Report ---    < end of copied text >         Chief Complaint:  Patient is a 65y old  Female who presents with a chief complaint of Stercoral Colitis (14 Oct 2021 03:01)      HPI: Pt is a 66yo F PMH HLD presents with abdominal pain, constipation and distention x 3 weeks. She states that all her symptoms started around the same time.  She usually has bowel movements once a day and they are regular however 3 weeks a go she noticed a decreased in the frequency that she was having bowel movements and also states that they were very small and clumpy.  She tried using a fleet enema once and it did not help.  Recently she has only been passing water.  Denies any blood in the BM.  States her last colonoscopy was 4 years ago and she was told to follow up in 7-10 years as everything was normal.  She denies f/c/sob/cp.  Denies N/V.  She brought the report of a CT scan she had in 2020 which showed a calcified mass in her cecum.  She states that she takes 1200 of Ca daily as instructed by her orthopedic surgeon.         Allergies:  No Known Allergies      Home Medications:      Hospital Medications:  ciprofloxacin   IVPB 400 milliGRAM(s) IV Intermittent every 12 hours  ciprofloxacin   IVPB      enoxaparin Injectable 40 milliGRAM(s) SubCutaneous every 24 hours  lactated ringers. 1000 milliLiter(s) IV Continuous <Continuous>  metroNIDAZOLE  IVPB 500 milliGRAM(s) IV Intermittent every 8 hours  metroNIDAZOLE  IVPB      mineral oil enema 133 milliLiter(s) Rectal once  polyethylene glycol 3350 17 Gram(s) Oral daily  senna 2 Tablet(s) Oral at bedtime      PMHX/PSHX:  No pertinent past medical history    H/O total knee replacement, left        Family history:      Social History:     ROS: As per HPI, 14-point ROS negative otherwise.    General:  No wt loss, fevers, chills, night sweats, fatigue,   Eyes:  Good vision, no reported pain  ENT:  No sore throat, pain, runny nose, dysphagia  CV:  No pain, palpitations, hypo/hypertension  Resp:  No dyspnea, cough, tachypnea, wheezing  GI:  See HPI  :  No pain, bleeding, incontinence, nocturia  Muscle:  No pain, weakness  Neuro:  No weakness, tingling, memory problems  Psych:  No fatigue, insomnia, mood problems, depression  Endocrine:  No polyuria, polydipsia, cold/heat intolerance  Heme:  No petechiae, ecchymosis, easy bruisability  Skin:  No rash, edema      PHYSICAL EXAM:     Vital Signs:  Vital Signs Last 24 Hrs  T(C): 36.6 (14 Oct 2021 03:41), Max: 36.9 (13 Oct 2021 23:30)  T(F): 97.8 (14 Oct 2021 03:41), Max: 98.5 (13 Oct 2021 23:30)  HR: 74 (14 Oct 2021 03:41) (74 - 90)  BP: 128/82 (14 Oct 2021 03:41) (117/74 - 146/75)  BP(mean): 99 (13 Oct 2021 23:30) (99 - 99)  RR: 18 (14 Oct 2021 03:41) (18 - 20)  SpO2: 96% (14 Oct 2021 03:41) (96% - 100%)  Daily Height in cm: 162.56 (13 Oct 2021 13:29)    Daily     GENERAL:  Appears stated age, well-groomed, well-nourished, no distress  HEENT:  NC/AT,  conjunctivae clear and pink  CHEST:  Full & symmetric excursion, no increased effort  HEART:  Regular rhythm, no JVD  ABDOMEN:  Soft, tender, distended  EXTREMITIES:  no cyanosis, clubbing or edema  SKIN:  No rash/erythema/ecchymoses/petechiae/wounds/abscess/warm/dry  NEURO:  Alert, oriented, nonfocal    LABS:                        9.9    10.15 )-----------( 354      ( 14 Oct 2021 09:21 )             31.7     10-13    139  |  100  |  12  ----------------------------<  94  3.5   |  22  |  0.65    Ca    8.9      13 Oct 2021 16:25    TPro  7.4  /  Alb  3.9  /  TBili  0.3  /  DBili  x   /  AST  12  /  ALT  17  /  AlkPhos  91  10-13    LIVER FUNCTIONS - ( 13 Oct 2021 16:25 )  Alb: 3.9 g/dL / Pro: 7.4 g/dL / ALK PHOS: 91 U/L / ALT: 17 U/L / AST: 12 U/L / GGT: x           PT/INR - ( 13 Oct 2021 23:44 )   PT: 15.3 sec;   INR: 1.29 ratio         PTT - ( 13 Oct 2021 23:44 )  PTT:21.9 sec  Urinalysis Basic - ( 13 Oct 2021 16:29 )    Color: Yellow / Appearance: Clear / S.038 / pH: x  Gluc: x / Ketone: Large  / Bili: Small / Urobili: 2 mg/dL   Blood: x / Protein: 100 / Nitrite: Negative   Leuk Esterase: Negative / RBC: 5 /hpf / WBC 2 /HPF   Sq Epi: x / Non Sq Epi: 3 /hpf / Bacteria: Negative      Amylase Serum--      Lipase serum19       Ammonia--      Imaging:    < from: CT Abdomen and Pelvis w/ Oral Cont and w/ IV Cont (10.13.21 @ 18:02) >  FINDINGS:  LOWER CHEST: Within normal limits.    LIVER: Liver cysts and subcentimeter hypodensities too small to characterize.  BILE DUCTS: Normal caliber.  GALLBLADDER: Within normal limits.  SPLEEN: Within normal limits.  PANCREAS: Within normal limits.  ADRENALS: Within normal limits.  KIDNEYS/URETERS: Left renal cyst.    BLADDER: Within normal limits.  REPRODUCTIVE ORGANS: Uterus and adnexa within normal limits.    BOWEL: A 4.4 x 3.9 cm peripherally calcified lesion within the rectosigmoid colon with surrounding fat stranding. Moderate colonic stool burden to the level of this lesion and there is bowel thickening proximal to the lesion. No stool noted distal to the peripherally calcified lesion. Appendix is normal.  PERITONEUM: No ascites.  VESSELS: Within normal limits.  RETROPERITONEUM/LYMPH NODES: Prominent retroperitoneal and pelvic lymph nodes. Enlarged left obturator lymph node measuring 1.6 x 1.0 cm (3:94).  ABDOMINAL WALL: Fat-containing umbilical hernia.  BONES: Within normal limits.    IMPRESSION:  Indeterminate calcified mass in the rectosigmoid colon with surrounding inflammation and constipation. The differential considerations include fecalith, foreign body, or tumor.    Findings were discussed with Dr. HARRY LOZANO 10/13/2021 7:12 PM with read back confirmation.    --- End of Report ---    < end of copied text >

## 2021-10-14 NOTE — PROGRESS NOTE ADULT - ASSESSMENT
Assessment:  65F on daily calcium 1200 coming into the ED presenting with 3 weeks abdominal pain, constipation, and distension.      Plan:  -NPO/IVF  -Cipro/Flagyl  -Fleet enemas  -Monitor for BM    Colorectal Surgery  [id number] Assessment:  65F on daily calcium 1200 coming into the ED presenting with 3 weeks abdominal pain, constipation, and distension. Concern for stercoral colitis vs. mass in rectum. Most likely large stool burden from constipation    Plan:  - NPO/IVF  - Cipro/Flagyl  - Hypaque enema  - Monitor for BM    Colorectal Surgery  [id number]

## 2021-10-14 NOTE — CONSULT NOTE ADULT - ASSESSMENT
64yo F PMH HLD presents with abdominal pain, constipation and distention x 3 weeks c/f fecalith resulting in significant constipation.    Impression:  # Constipation: secondary to likely large stool burden. Patient on high dose of calcium daily. DDx includes tumor although less likely likely given clinical context.  # Hyperlipidemia    Recommendation:  - hypaque enema  - depending on success of enema, will consider flex sig to help debulk stool  - keep NPO  - IVF  - replete lytes  - can trial bowel prep with golytely as well  - supportive care    Manuel Lezama  [phone]  Please call/page on call fellow on weekends and weekdays after 5pm

## 2021-10-14 NOTE — ED ADULT NURSE REASSESSMENT NOTE - NS ED NURSE REASSESS COMMENT FT1
Patient to RN "I don't want tylenol, it does nothing for me" and "Can I wait until I go upstairs to do the enema?"

## 2021-10-14 NOTE — CONSULT NOTE ADULT - ATTENDING COMMENTS
66yo F PMH HLD presents with abdominal pain, constipation and distention x 3 weeks c/f fecalith resulting in significant constipation.  Spoke with colorectal surgery, if hypaque enema unsuccessful will try endoscopic means to remove fecalith. If not successful then likely will need to proceed to surgical intervention.    Thank you for this interesting consult.  Please call the advanced GI service with any questions or concerns.

## 2021-10-15 LAB
ANION GAP SERPL CALC-SCNC: 15 MMOL/L — SIGNIFICANT CHANGE UP (ref 5–17)
BUN SERPL-MCNC: 5 MG/DL — LOW (ref 7–23)
CALCIUM SERPL-MCNC: 8.8 MG/DL — SIGNIFICANT CHANGE UP (ref 8.4–10.5)
CHLORIDE SERPL-SCNC: 102 MMOL/L — SIGNIFICANT CHANGE UP (ref 96–108)
CO2 SERPL-SCNC: 20 MMOL/L — LOW (ref 22–31)
COVID-19 SPIKE DOMAIN AB INTERP: POSITIVE
COVID-19 SPIKE DOMAIN ANTIBODY RESULT: >250 U/ML — HIGH
CREAT SERPL-MCNC: 0.58 MG/DL — SIGNIFICANT CHANGE UP (ref 0.5–1.3)
GLUCOSE SERPL-MCNC: 99 MG/DL — SIGNIFICANT CHANGE UP (ref 70–99)
HCT VFR BLD CALC: 32.3 % — LOW (ref 34.5–45)
HGB BLD-MCNC: 10.4 G/DL — LOW (ref 11.5–15.5)
MAGNESIUM SERPL-MCNC: 1.9 MG/DL — SIGNIFICANT CHANGE UP (ref 1.6–2.6)
MCHC RBC-ENTMCNC: 26.3 PG — LOW (ref 27–34)
MCHC RBC-ENTMCNC: 32.2 GM/DL — SIGNIFICANT CHANGE UP (ref 32–36)
MCV RBC AUTO: 81.6 FL — SIGNIFICANT CHANGE UP (ref 80–100)
NRBC # BLD: 0 /100 WBCS — SIGNIFICANT CHANGE UP (ref 0–0)
PHOSPHATE SERPL-MCNC: 2.8 MG/DL — SIGNIFICANT CHANGE UP (ref 2.5–4.5)
PLATELET # BLD AUTO: 349 K/UL — SIGNIFICANT CHANGE UP (ref 150–400)
POTASSIUM SERPL-MCNC: 3.6 MMOL/L — SIGNIFICANT CHANGE UP (ref 3.5–5.3)
POTASSIUM SERPL-SCNC: 3.6 MMOL/L — SIGNIFICANT CHANGE UP (ref 3.5–5.3)
RBC # BLD: 3.96 M/UL — SIGNIFICANT CHANGE UP (ref 3.8–5.2)
RBC # FLD: 13.7 % — SIGNIFICANT CHANGE UP (ref 10.3–14.5)
SARS-COV-2 IGG+IGM SERPL QL IA: >250 U/ML — HIGH
SARS-COV-2 IGG+IGM SERPL QL IA: POSITIVE
SARS-COV-2 RNA SPEC QL NAA+PROBE: SIGNIFICANT CHANGE UP
SODIUM SERPL-SCNC: 137 MMOL/L — SIGNIFICANT CHANGE UP (ref 135–145)
WBC # BLD: 8.4 K/UL — SIGNIFICANT CHANGE UP (ref 3.8–10.5)
WBC # FLD AUTO: 8.4 K/UL — SIGNIFICANT CHANGE UP (ref 3.8–10.5)

## 2021-10-15 PROCEDURE — 45378 DIAGNOSTIC COLONOSCOPY: CPT | Mod: GC

## 2021-10-15 RX ORDER — ONDANSETRON 8 MG/1
4 TABLET, FILM COATED ORAL ONCE
Refills: 0 | Status: COMPLETED | OUTPATIENT
Start: 2021-10-15 | End: 2021-10-15

## 2021-10-15 RX ORDER — SODIUM CHLORIDE 9 MG/ML
500 INJECTION INTRAMUSCULAR; INTRAVENOUS; SUBCUTANEOUS
Refills: 0 | Status: COMPLETED | OUTPATIENT
Start: 2021-10-15 | End: 2021-10-15

## 2021-10-15 RX ORDER — POTASSIUM PHOSPHATE, MONOBASIC POTASSIUM PHOSPHATE, DIBASIC 236; 224 MG/ML; MG/ML
15 INJECTION, SOLUTION INTRAVENOUS ONCE
Refills: 0 | Status: COMPLETED | OUTPATIENT
Start: 2021-10-15 | End: 2021-10-15

## 2021-10-15 RX ORDER — SOD SULF/SODIUM/NAHCO3/KCL/PEG
2000 SOLUTION, RECONSTITUTED, ORAL ORAL ONCE
Refills: 0 | Status: COMPLETED | OUTPATIENT
Start: 2021-10-15 | End: 2021-10-15

## 2021-10-15 RX ORDER — SOD SULF/SODIUM/NAHCO3/KCL/PEG
4000 SOLUTION, RECONSTITUTED, ORAL ORAL ONCE
Refills: 0 | Status: DISCONTINUED | OUTPATIENT
Start: 2021-10-15 | End: 2021-10-15

## 2021-10-15 RX ORDER — SOD SULF/SODIUM/NAHCO3/KCL/PEG
2000 SOLUTION, RECONSTITUTED, ORAL ORAL ONCE
Refills: 0 | Status: DISCONTINUED | OUTPATIENT
Start: 2021-10-15 | End: 2021-10-15

## 2021-10-15 RX ORDER — KETOROLAC TROMETHAMINE 30 MG/ML
15 SYRINGE (ML) INJECTION ONCE
Refills: 0 | Status: DISCONTINUED | OUTPATIENT
Start: 2021-10-15 | End: 2021-10-15

## 2021-10-15 RX ORDER — MAGNESIUM SULFATE 500 MG/ML
2 VIAL (ML) INJECTION ONCE
Refills: 0 | Status: COMPLETED | OUTPATIENT
Start: 2021-10-15 | End: 2021-10-15

## 2021-10-15 RX ADMIN — Medication 100 MILLIEQUIVALENT(S): at 01:52

## 2021-10-15 RX ADMIN — ENOXAPARIN SODIUM 40 MILLIGRAM(S): 100 INJECTION SUBCUTANEOUS at 05:59

## 2021-10-15 RX ADMIN — Medication 100 MILLIGRAM(S): at 00:20

## 2021-10-15 RX ADMIN — Medication 15 MILLIGRAM(S): at 23:50

## 2021-10-15 RX ADMIN — Medication 100 MILLIGRAM(S): at 23:50

## 2021-10-15 RX ADMIN — SODIUM CHLORIDE 30 MILLILITER(S): 9 INJECTION INTRAMUSCULAR; INTRAVENOUS; SUBCUTANEOUS at 18:40

## 2021-10-15 RX ADMIN — ONDANSETRON 4 MILLIGRAM(S): 8 TABLET, FILM COATED ORAL at 22:25

## 2021-10-15 RX ADMIN — POTASSIUM PHOSPHATE, MONOBASIC POTASSIUM PHOSPHATE, DIBASIC 62.5 MILLIMOLE(S): 236; 224 INJECTION, SOLUTION INTRAVENOUS at 08:57

## 2021-10-15 RX ADMIN — Medication 2000 MILLILITER(S): at 18:30

## 2021-10-15 RX ADMIN — Medication 50 GRAM(S): at 09:32

## 2021-10-15 RX ADMIN — Medication 100 MILLIGRAM(S): at 09:31

## 2021-10-15 RX ADMIN — Medication 200 MILLIGRAM(S): at 22:26

## 2021-10-15 NOTE — CHART NOTE - NSCHARTNOTEFT_GEN_A_CORE
Surgery Post-Op Note    Pre-Op Dx: calcified stool mass 26cm from anal verge  Procedure: flexible sigmoidoscopy     SUBJECTIVE:  Pt seen and examined at the bedside. Pt w/ no complaints with respect to the endoscopic procedure. Denies F/C/N/V. Pain controlled with medication. No chest pain or shortness of breath.    OBJECTIVE:  Vital Signs Last 24 Hrs  T(C): 36.6 (15 Oct 2021 21:25), Max: 37.2 (15 Oct 2021 12:54)  T(F): 97.9 (15 Oct 2021 21:25), Max: 99 (15 Oct 2021 12:54)  HR: 76 (15 Oct 2021 21:25) (63 - 90)  BP: 104/62 (15 Oct 2021 21:25) (104/62 - 155/99)  BP(mean): --  RR: 18 (15 Oct 2021 21:25) (12 - 20)  SpO2: 100% (15 Oct 2021 21:25) (96% - 100%)    Physical Exam:  General: NAD, resting comfortably in bed  Abdominal: soft, NTTP, mildly distended  Extremities: WWP    LABS:                        10.4   8.40  )-----------( 349      ( 15 Oct 2021 07:10 )             32.3     10-15    137  |  102  |  5<L>  ----------------------------<  99  3.6   |  20<L>  |  0.58    Ca    8.8      15 Oct 2021 07:10  Phos  2.8     10-15  Mg     1.9     10-15      PT/INR - ( 13 Oct 2021 23:44 )   PT: 15.3 sec;   INR: 1.29 ratio         PTT - ( 13 Oct 2021 23:44 )  PTT:21.9 sec  CAPILLARY BLOOD GLUCOSE        ASSESSMENT: 65y Female now 4hours s/p flexible sigmoidoscopy for calcified stool mass 26cm from anal verge. Unable to endoscopically remove the mass.     PLAN:  - Pain control  - Nausea control PRN  - Golytely prep  - Monitor vitals  - Diet: NPO  - OOB/ Ambulate  - DVT ppx     Team Surgery

## 2021-10-15 NOTE — PROGRESS NOTE ADULT - SUBJECTIVE AND OBJECTIVE BOX
TEAM Surgery Progress Note    INTERVAL EVENTS: Patient required some Tylenol for pain. Otherwise, no acute events overnight.  SUBJECTIVE: Patient seen and examined at bedside with surgical team.    OBJECTIVE:    Vital Signs Last 24 Hrs  T(C): 36.7 (15 Oct 2021 00:53), Max: 37.1 (14 Oct 2021 20:39)  T(F): 98.1 (15 Oct 2021 00:53), Max: 98.8 (14 Oct 2021 20:39)  HR: 63 (15 Oct 2021 00:53) (63 - 74)  BP: 126/77 (15 Oct 2021 00:53) (119/76 - 129/83)  BP(mean): --  RR: 18 (15 Oct 2021 00:53) (18 - 18)  SpO2: 97% (15 Oct 2021 00:53) (96% - 99%)I&O's Detail    13 Oct 2021 07:01  -  14 Oct 2021 07:00  --------------------------------------------------------  IN:    Lactated Ringers: 240 mL  Total IN: 240 mL    OUT:  Total OUT: 0 mL    Total NET: 240 mL      14 Oct 2021 07:01  -  15 Oct 2021 01:10  --------------------------------------------------------  IN:  Total IN: 0 mL    OUT:    Oral Fluid: 0 mL    Voided (mL): 200 mL  Total OUT: 200 mL    Total NET: -200 mL      MEDICATIONS  (STANDING):  ciprofloxacin   IVPB 400 milliGRAM(s) IV Intermittent every 12 hours  ciprofloxacin   IVPB      dextrose 5% + sodium chloride 0.45% with potassium chloride 20 mEq/L 1000 milliLiter(s) (100 mL/Hr) IV Continuous <Continuous>  enoxaparin Injectable 40 milliGRAM(s) SubCutaneous every 24 hours  HYDROmorphone  Injectable 0.25 milliGRAM(s) IV Push once  metroNIDAZOLE  IVPB 500 milliGRAM(s) IV Intermittent every 8 hours  metroNIDAZOLE  IVPB      mineral oil enema 133 milliLiter(s) Rectal once  polyethylene glycol 3350 17 Gram(s) Oral daily  potassium chloride  10 mEq/100 mL IVPB 10 milliEquivalent(s) IV Intermittent every 1 hour  senna 2 Tablet(s) Oral at bedtime    MEDICATIONS  (PRN):      PHYSICAL EXAM:  Gen: NAD, pleasant  Resp: B/l chest rise, no increased work of breathing  ABD: Distended, not tympanic, Slightly tender in lower mid abdomen, no rebound or guarding    LABS:                        9.9    10.15 )-----------( 354      ( 14 Oct 2021 09:21 )             31.7     10-14    139  |  102  |  8   ----------------------------<  77  3.5   |  21<L>  |  0.58    Ca    8.6      14 Oct 2021 09:21  Phos  3.2     10-14  Mg     2.0     10-14    TPro  7.4  /  Alb  3.9  /  TBili  0.3  /  DBili  x   /  AST  12  /  ALT  17  /  AlkPhos  91  10-13    PT/INR - ( 13 Oct 2021 23:44 )   PT: 15.3 sec;   INR: 1.29 ratio         PTT - ( 13 Oct 2021 23:44 )  PTT:21.9 sec  LIVER FUNCTIONS - ( 13 Oct 2021 16:25 )  Alb: 3.9 g/dL / Pro: 7.4 g/dL / ALK PHOS: 91 U/L / ALT: 17 U/L / AST: 12 U/L / GGT: x           Urinalysis Basic - ( 13 Oct 2021 16:29 )    Color: Yellow / Appearance: Clear / S.038 / pH: x  Gluc: x / Ketone: Large  / Bili: Small / Urobili: 2 mg/dL   Blood: x / Protein: 100 / Nitrite: Negative   Leuk Esterase: Negative / RBC: 5 /hpf / WBC 2 /HPF   Sq Epi: x / Non Sq Epi: 3 /hpf / Bacteria: Negative      ABO Interpretation: B (10-14-21 @ 08:43)      IMAGING:     TEAM Surgery Progress Note    INTERVAL EVENTS: Patient required some Tylenol for pain. Otherwise, no acute events overnight.    SUBJECTIVE: Patient seen and examined at bedside with surgical team. Pt reporting some liquid stool output but no solid stool. Denies abd pain.     OBJECTIVE:    Vital Signs Last 24 Hrs  T(C): 36.7 (15 Oct 2021 00:53), Max: 37.1 (14 Oct 2021 20:39)  T(F): 98.1 (15 Oct 2021 00:53), Max: 98.8 (14 Oct 2021 20:39)  HR: 63 (15 Oct 2021 00:53) (63 - 74)  BP: 126/77 (15 Oct 2021 00:53) (119/76 - 129/83)  BP(mean): --  RR: 18 (15 Oct 2021 00:53) (18 - 18)  SpO2: 97% (15 Oct 2021 00:53) (96% - 99%)I&O's Detail    13 Oct 2021 07:01  -  14 Oct 2021 07:00  --------------------------------------------------------  IN:    Lactated Ringers: 240 mL  Total IN: 240 mL    OUT:  Total OUT: 0 mL    Total NET: 240 mL      14 Oct 2021 07:01  -  15 Oct 2021 01:10  --------------------------------------------------------  IN:  Total IN: 0 mL    OUT:    Oral Fluid: 0 mL    Voided (mL): 200 mL  Total OUT: 200 mL    Total NET: -200 mL      MEDICATIONS  (STANDING):  ciprofloxacin   IVPB 400 milliGRAM(s) IV Intermittent every 12 hours  ciprofloxacin   IVPB      dextrose 5% + sodium chloride 0.45% with potassium chloride 20 mEq/L 1000 milliLiter(s) (100 mL/Hr) IV Continuous <Continuous>  enoxaparin Injectable 40 milliGRAM(s) SubCutaneous every 24 hours  HYDROmorphone  Injectable 0.25 milliGRAM(s) IV Push once  metroNIDAZOLE  IVPB 500 milliGRAM(s) IV Intermittent every 8 hours  metroNIDAZOLE  IVPB      mineral oil enema 133 milliLiter(s) Rectal once  polyethylene glycol 3350 17 Gram(s) Oral daily  potassium chloride  10 mEq/100 mL IVPB 10 milliEquivalent(s) IV Intermittent every 1 hour  senna 2 Tablet(s) Oral at bedtime    MEDICATIONS  (PRN):      PHYSICAL EXAM:  Gen: NAD, pleasant  Resp: B/l chest rise, no increased work of breathing  ABD: Distended, not tympanic, Slightly tender in lower mid abdomen, no rebound or guarding    LABS:                        9.9    10.15 )-----------( 354      ( 14 Oct 2021 09:21 )             31.7     10-14    139  |  102  |  8   ----------------------------<  77  3.5   |  21<L>  |  0.58    Ca    8.6      14 Oct 2021 09:21  Phos  3.2     10-14  Mg     2.0     10-    TPro  7.4  /  Alb  3.9  /  TBili  0.3  /  DBili  x   /  AST  12  /  ALT  17  /  AlkPhos  91  10-13    PT/INR - ( 13 Oct 2021 23:44 )   PT: 15.3 sec;   INR: 1.29 ratio         PTT - ( 13 Oct 2021 23:44 )  PTT:21.9 sec  LIVER FUNCTIONS - ( 13 Oct 2021 16:25 )  Alb: 3.9 g/dL / Pro: 7.4 g/dL / ALK PHOS: 91 U/L / ALT: 17 U/L / AST: 12 U/L / GGT: x           Urinalysis Basic - ( 13 Oct 2021 16:29 )    Color: Yellow / Appearance: Clear / S.038 / pH: x  Gluc: x / Ketone: Large  / Bili: Small / Urobili: 2 mg/dL   Blood: x / Protein: 100 / Nitrite: Negative   Leuk Esterase: Negative / RBC: 5 /hpf / WBC 2 /HPF   Sq Epi: x / Non Sq Epi: 3 /hpf / Bacteria: Negative      ABO Interpretation: B (10-14-21 @ 08:43)      IMAGING:

## 2021-10-15 NOTE — PROGRESS NOTE ADULT - ASSESSMENT
Assessment:  65F on daily calcium 1200 coming into the ED presenting with 3 weeks abdominal pain, constipation, and distension. Concern for stercoral colitis vs. mass in rectum. Most likely large stool burden from constipation    Plan:  - NPO/IVF  - Flex sig today  - Cipro/Flagyl  - Monitor for BM    Colorectal Surgery  [id number] Assessment:  65F on daily calcium 1200 coming into the ED presenting with 3 weeks abdominal pain, constipation, and distension. Concern for stercoral colitis vs. mass in rectum. Most likely large stool burden from constipation    Plan:  - Flex sig today  - Cipro/Flagyl  - Monitor for BM    [facility] Team Surgery  [id number]

## 2021-10-15 NOTE — PRE PROCEDURE NOTE - PRE PROCEDURE EVALUATION
Attending Physician:   Dr. Aaron                         Procedure:  Flexible Sigmoidoscopy    Indication for Procedure:   Possible Fecal Disimpaction  ________________________________________________________  PAST MEDICAL & SURGICAL HISTORY:    Colitis    H/O total knee replacement, left      ALLERGIES:  No Known Allergies    HOME MEDICATIONS:    AICD/PPM: [ ] yes   [X ] no    PERTINENT LAB DATA:                        10.4   8.40  )-----------( 349      ( 15 Oct 2021 07:10 )             32.3     10-15    137  |  102  |  5<L>  ----------------------------<  99  3.6   |  20<L>  |  0.58    Ca    8.8      15 Oct 2021 07:10  Phos  2.8     10-15  Mg     1.9     10-15  TPro  7.4  /  Alb  3.9  /  TBili  0.3  /  DBili  x   /  AST  12  /  ALT  17  /  AlkPhos  91  10-13  PT/INR - ( 13 Oct 2021 23:44 )   PT: 15.3 sec;   INR: 1.29 ratio    PTT - ( 13 Oct 2021 23:44 )  PTT:21.9 sec    PHYSICAL EXAMINATION:    T(C): 37.2  HR: 72  BP: 123/74  RR: 18  SpO2: 100%    Constitutional: NAD    Neck:  No JVD  Respiratory: CTAB/L  Cardiovascular: S1 and S2  Gastrointestinal: BS+, soft, NT/ND  Extremities: No peripheral edema  Neurological: A/O x 4      COMMENTS:    The patient is a suitable candidate for the planned procedure unless box checked [ ]  No, explain:

## 2021-10-15 NOTE — CHART NOTE - NSCHARTNOTEFT_GEN_A_CORE
S/p flexible sigmoidoscopy. Calcified stool burden noted at 26cm from anal verge. Unable to endoscopically remove.   - trial golytely prep  - if unable to dislodge stool with prep, will likely need surgical intervention    D/w GI and surgical attending.

## 2021-10-16 ENCOUNTER — TRANSCRIPTION ENCOUNTER (OUTPATIENT)
Age: 66
End: 2021-10-16

## 2021-10-16 LAB
ANION GAP SERPL CALC-SCNC: 14 MMOL/L — SIGNIFICANT CHANGE UP (ref 5–17)
APTT BLD: 25.7 SEC — LOW (ref 27.5–35.5)
BUN SERPL-MCNC: 5 MG/DL — LOW (ref 7–23)
CALCIUM SERPL-MCNC: 8.6 MG/DL — SIGNIFICANT CHANGE UP (ref 8.4–10.5)
CHLORIDE SERPL-SCNC: 109 MMOL/L — HIGH (ref 96–108)
CO2 SERPL-SCNC: 19 MMOL/L — LOW (ref 22–31)
CREAT SERPL-MCNC: 0.56 MG/DL — SIGNIFICANT CHANGE UP (ref 0.5–1.3)
GLUCOSE SERPL-MCNC: 114 MG/DL — HIGH (ref 70–99)
HCT VFR BLD CALC: 33.9 % — LOW (ref 34.5–45)
HGB BLD-MCNC: 10.6 G/DL — LOW (ref 11.5–15.5)
INR BLD: 1.29 RATIO — HIGH (ref 0.88–1.16)
MAGNESIUM SERPL-MCNC: 2.1 MG/DL — SIGNIFICANT CHANGE UP (ref 1.6–2.6)
MCHC RBC-ENTMCNC: 25.9 PG — LOW (ref 27–34)
MCHC RBC-ENTMCNC: 31.3 GM/DL — LOW (ref 32–36)
MCV RBC AUTO: 82.9 FL — SIGNIFICANT CHANGE UP (ref 80–100)
NRBC # BLD: 0 /100 WBCS — SIGNIFICANT CHANGE UP (ref 0–0)
PHOSPHATE SERPL-MCNC: 3 MG/DL — SIGNIFICANT CHANGE UP (ref 2.5–4.5)
PLATELET # BLD AUTO: 333 K/UL — SIGNIFICANT CHANGE UP (ref 150–400)
POTASSIUM SERPL-MCNC: 3.9 MMOL/L — SIGNIFICANT CHANGE UP (ref 3.5–5.3)
POTASSIUM SERPL-SCNC: 3.9 MMOL/L — SIGNIFICANT CHANGE UP (ref 3.5–5.3)
PROTHROM AB SERPL-ACNC: 15.3 SEC — HIGH (ref 10.6–13.6)
RBC # BLD: 4.09 M/UL — SIGNIFICANT CHANGE UP (ref 3.8–5.2)
RBC # FLD: 14 % — SIGNIFICANT CHANGE UP (ref 10.3–14.5)
SODIUM SERPL-SCNC: 142 MMOL/L — SIGNIFICANT CHANGE UP (ref 135–145)
WBC # BLD: 7.66 K/UL — SIGNIFICANT CHANGE UP (ref 3.8–10.5)
WBC # FLD AUTO: 7.66 K/UL — SIGNIFICANT CHANGE UP (ref 3.8–10.5)

## 2021-10-16 PROCEDURE — 93010 ELECTROCARDIOGRAM REPORT: CPT

## 2021-10-16 PROCEDURE — 99232 SBSQ HOSP IP/OBS MODERATE 35: CPT | Mod: GC

## 2021-10-16 PROCEDURE — 71045 X-RAY EXAM CHEST 1 VIEW: CPT | Mod: 26

## 2021-10-16 PROCEDURE — 74176 CT ABD & PELVIS W/O CONTRAST: CPT | Mod: 26

## 2021-10-16 RX ORDER — CIPROFLOXACIN LACTATE 400MG/40ML
250 VIAL (ML) INTRAVENOUS ONCE
Refills: 0 | Status: COMPLETED | OUTPATIENT
Start: 2021-10-16 | End: 2021-10-16

## 2021-10-16 RX ORDER — ACETAMINOPHEN 500 MG
1000 TABLET ORAL EVERY 6 HOURS
Refills: 0 | Status: COMPLETED | OUTPATIENT
Start: 2021-10-16 | End: 2021-10-17

## 2021-10-16 RX ORDER — METRONIDAZOLE 500 MG
250 TABLET ORAL
Refills: 0 | Status: COMPLETED | OUTPATIENT
Start: 2021-10-16 | End: 2021-10-16

## 2021-10-16 RX ORDER — ONDANSETRON 8 MG/1
4 TABLET, FILM COATED ORAL EVERY 8 HOURS
Refills: 0 | Status: DISCONTINUED | OUTPATIENT
Start: 2021-10-16 | End: 2021-10-17

## 2021-10-16 RX ORDER — METRONIDAZOLE 500 MG
250 TABLET ORAL ONCE
Refills: 0 | Status: COMPLETED | OUTPATIENT
Start: 2021-10-16 | End: 2021-10-16

## 2021-10-16 RX ORDER — CIPROFLOXACIN LACTATE 400MG/40ML
250 VIAL (ML) INTRAVENOUS
Refills: 0 | Status: COMPLETED | OUTPATIENT
Start: 2021-10-16 | End: 2021-10-16

## 2021-10-16 RX ORDER — ACETAMINOPHEN 500 MG
650 TABLET ORAL EVERY 6 HOURS
Refills: 0 | Status: DISCONTINUED | OUTPATIENT
Start: 2021-10-16 | End: 2021-10-17

## 2021-10-16 RX ADMIN — Medication 15 MILLIGRAM(S): at 00:20

## 2021-10-16 RX ADMIN — Medication 1000 MILLIGRAM(S): at 09:00

## 2021-10-16 RX ADMIN — Medication 100 MILLIGRAM(S): at 07:52

## 2021-10-16 RX ADMIN — Medication 1000 MILLIGRAM(S): at 17:55

## 2021-10-16 RX ADMIN — Medication 250 MILLIGRAM(S): at 14:33

## 2021-10-16 RX ADMIN — ENOXAPARIN SODIUM 40 MILLIGRAM(S): 100 INJECTION SUBCUTANEOUS at 05:39

## 2021-10-16 RX ADMIN — Medication 200 MILLIGRAM(S): at 23:45

## 2021-10-16 RX ADMIN — Medication 250 MILLIGRAM(S): at 13:00

## 2021-10-16 RX ADMIN — Medication 250 MILLIGRAM(S): at 14:32

## 2021-10-16 RX ADMIN — Medication 400 MILLIGRAM(S): at 08:31

## 2021-10-16 RX ADMIN — Medication 100 MILLIGRAM(S): at 17:52

## 2021-10-16 RX ADMIN — Medication 400 MILLIGRAM(S): at 17:52

## 2021-10-16 NOTE — PROGRESS NOTE ADULT - SUBJECTIVE AND OBJECTIVE BOX
TEAM Surgery Progress Note    INTERVAL EVENTS: Covid negative. Patient is s/p flex sig with GI team which revealed calcified stool mass 26cm from anal verge, however unresectable. Patient is currently drinking Golytely prep but has had some bouts of nausea and abdominal cramps. She is having difficulty drinking more than one bottle.     SUBJECTIVE: Patient seen and examined at bedside with surgical team.    OBJECTIVE:    Vital Signs Last 24 Hrs  T(C): 36.6 (15 Oct 2021 21:25), Max: 37.2 (15 Oct 2021 12:54)  T(F): 97.9 (15 Oct 2021 21:25), Max: 99 (15 Oct 2021 12:54)  HR: 76 (15 Oct 2021 21:25) (63 - 90)  BP: 104/62 (15 Oct 2021 21:25) (104/62 - 155/99)  BP(mean): --  RR: 18 (15 Oct 2021 21:25) (12 - 20)  SpO2: 100% (15 Oct 2021 21:25) (96% - 100%)I&O's Detail    14 Oct 2021 07:01  -  15 Oct 2021 07:00  --------------------------------------------------------  IN:    dextrose 5% + sodium chloride 0.45% w/ Additives: 1200 mL    IV PiggyBack: 300 mL  Total IN: 1500 mL    OUT:    Oral Fluid: 0 mL    Voided (mL): 200 mL  Total OUT: 200 mL    Total NET: 1300 mL      15 Oct 2021 07:01  -  16 Oct 2021 00:18  --------------------------------------------------------  IN:  Total IN: 0 mL    OUT:    Oral Fluid: 0 mL    Voided (mL): 250 mL  Total OUT: 250 mL    Total NET: -250 mL      MEDICATIONS  (STANDING):  ciprofloxacin   IVPB      ciprofloxacin   IVPB 400 milliGRAM(s) IV Intermittent every 12 hours  dextrose 5% + sodium chloride 0.45% with potassium chloride 20 mEq/L 1000 milliLiter(s) (100 mL/Hr) IV Continuous <Continuous>  enoxaparin Injectable 40 milliGRAM(s) SubCutaneous every 24 hours  HYDROmorphone  Injectable 0.25 milliGRAM(s) IV Push once  metroNIDAZOLE  IVPB 500 milliGRAM(s) IV Intermittent every 8 hours  metroNIDAZOLE  IVPB      mineral oil enema 133 milliLiter(s) Rectal once  polyethylene glycol 3350 17 Gram(s) Oral daily  senna 2 Tablet(s) Oral at bedtime    MEDICATIONS  (PRN):      PHYSICAL EXAM:  Constitutional: A&Ox3, NAD  Respiratory: Unlabored breathing  Abdomen: Soft, nondistended, NTTP. No rebound or guarding.  Extremities: WWP, ATWOOD spontaneously    LABS:                        10.4   8.40  )-----------( 349      ( 15 Oct 2021 07:10 )             32.3     10-15    137  |  102  |  5<L>  ----------------------------<  99  3.6   |  20<L>  |  0.58    Ca    8.8      15 Oct 2021 07:10  Phos  2.8     10-15  Mg     1.9     10-15                IMAGING:     TEAM Surgery Progress Note    INTERVAL EVENTS: Covid negative. Patient is s/p flex sig with GI team which revealed calcified stool mass 26cm from anal verge, however unresectable. Patient is currently drinking Golytely prep but has had some bouts of nausea and abdominal cramps. She is having difficulty drinking more than one bottle.     SUBJECTIVE: Patient seen and examined at bedside with surgical team.    OBJECTIVE:    Vital Signs Last 24 Hrs  T(C): 36.6 (15 Oct 2021 21:25), Max: 37.2 (15 Oct 2021 12:54)  T(F): 97.9 (15 Oct 2021 21:25), Max: 99 (15 Oct 2021 12:54)  HR: 76 (15 Oct 2021 21:25) (63 - 90)  BP: 104/62 (15 Oct 2021 21:25) (104/62 - 155/99)  BP(mean): --  RR: 18 (15 Oct 2021 21:25) (12 - 20)  SpO2: 100% (15 Oct 2021 21:25) (96% - 100%)I&O's Detail    14 Oct 2021 07:01  -  15 Oct 2021 07:00  --------------------------------------------------------  IN:    dextrose 5% + sodium chloride 0.45% w/ Additives: 1200 mL    IV PiggyBack: 300 mL  Total IN: 1500 mL    OUT:    Oral Fluid: 0 mL    Voided (mL): 200 mL  Total OUT: 200 mL    Total NET: 1300 mL      15 Oct 2021 07:01  -  16 Oct 2021 00:18  --------------------------------------------------------  IN:  Total IN: 0 mL    OUT:    Oral Fluid: 0 mL    Voided (mL): 250 mL  Total OUT: 250 mL    Total NET: -250 mL      MEDICATIONS  (STANDING):  ciprofloxacin   IVPB      ciprofloxacin   IVPB 400 milliGRAM(s) IV Intermittent every 12 hours  dextrose 5% + sodium chloride 0.45% with potassium chloride 20 mEq/L 1000 milliLiter(s) (100 mL/Hr) IV Continuous <Continuous>  enoxaparin Injectable 40 milliGRAM(s) SubCutaneous every 24 hours  HYDROmorphone  Injectable 0.25 milliGRAM(s) IV Push once  metroNIDAZOLE  IVPB 500 milliGRAM(s) IV Intermittent every 8 hours  metroNIDAZOLE  IVPB      mineral oil enema 133 milliLiter(s) Rectal once  polyethylene glycol 3350 17 Gram(s) Oral daily  senna 2 Tablet(s) Oral at bedtime    MEDICATIONS  (PRN):      PHYSICAL EXAM:  Constitutional: A&Ox3, NAD  Respiratory: Unlabored breathing  Abdomen: Soft, mildly distended, NTTP. No rebound or guarding.  Extremities: WWP, ATWOOD spontaneously    LABS:                        10.4   8.40  )-----------( 349      ( 15 Oct 2021 07:10 )             32.3     10-15    137  |  102  |  5<L>  ----------------------------<  99  3.6   |  20<L>  |  0.58    Ca    8.8      15 Oct 2021 07:10  Phos  2.8     10-15  Mg     1.9     10-15                IMAGING:     TEAM Surgery Progress Note    INTERVAL EVENTS: Covid negative. Patient is s/p flex sig with GI team which revealed calcified stool mass 26cm from anal verge, however unresectable. Patient is currently drinking Golytely prep but has had some bouts of nausea and abdominal cramps. She is having difficulty drinking more than one bottle.     SUBJECTIVE: Patient seen and examined at bedside with surgical team. Patient complains of nausea 2/2 golytely prep but denies acute onset abdominal pain, fevers, chills, vomiting, SOB, CP, lightheadedness    OBJECTIVE:    Vital Signs Last 24 Hrs  T(C): 36.6 (15 Oct 2021 21:25), Max: 37.2 (15 Oct 2021 12:54)  T(F): 97.9 (15 Oct 2021 21:25), Max: 99 (15 Oct 2021 12:54)  HR: 76 (15 Oct 2021 21:25) (63 - 90)  BP: 104/62 (15 Oct 2021 21:25) (104/62 - 155/99)  BP(mean): --  RR: 18 (15 Oct 2021 21:25) (12 - 20)  SpO2: 100% (15 Oct 2021 21:25) (96% - 100%)I&O's Detail    14 Oct 2021 07:01  -  15 Oct 2021 07:00  --------------------------------------------------------  IN:    dextrose 5% + sodium chloride 0.45% w/ Additives: 1200 mL    IV PiggyBack: 300 mL  Total IN: 1500 mL    OUT:    Oral Fluid: 0 mL    Voided (mL): 200 mL  Total OUT: 200 mL    Total NET: 1300 mL      15 Oct 2021 07:01  -  16 Oct 2021 00:18  --------------------------------------------------------  IN:  Total IN: 0 mL    OUT:    Oral Fluid: 0 mL    Voided (mL): 250 mL  Total OUT: 250 mL    Total NET: -250 mL      MEDICATIONS  (STANDING):  ciprofloxacin   IVPB      ciprofloxacin   IVPB 400 milliGRAM(s) IV Intermittent every 12 hours  dextrose 5% + sodium chloride 0.45% with potassium chloride 20 mEq/L 1000 milliLiter(s) (100 mL/Hr) IV Continuous <Continuous>  enoxaparin Injectable 40 milliGRAM(s) SubCutaneous every 24 hours  HYDROmorphone  Injectable 0.25 milliGRAM(s) IV Push once  metroNIDAZOLE  IVPB 500 milliGRAM(s) IV Intermittent every 8 hours  metroNIDAZOLE  IVPB      mineral oil enema 133 milliLiter(s) Rectal once  polyethylene glycol 3350 17 Gram(s) Oral daily  senna 2 Tablet(s) Oral at bedtime    MEDICATIONS  (PRN):      PHYSICAL EXAM:  Constitutional: A&Ox3, NAD  Respiratory: Unlabored breathing  Abdomen: Soft, mildly distended, NTTP. No rebound or guarding.  Extremities: WWP, ATWOOD spontaneously    LABS:                        10.4   8.40  )-----------( 349      ( 15 Oct 2021 07:10 )             32.3     10-15    137  |  102  |  5<L>  ----------------------------<  99  3.6   |  20<L>  |  0.58    Ca    8.8      15 Oct 2021 07:10  Phos  2.8     10-15  Mg     1.9     10-15                IMAGING:     TEAM Surgery Progress Note    INTERVAL EVENTS: Covid negative. Patient is s/p flex sig with GI team which revealed calcified stool mass 26cm from anal verge, however unresectable. Patient is currently drinking Golytely prep but has had some bouts of nausea and abdominal cramps. She is having difficulty drinking more than one bottle.     SUBJECTIVE: Patient seen and examined at bedside with surgical team. Patient complains of nausea 2/2 golytely prep but denies acute onset abdominal pain, fevers, chills, vomiting, SOB, CP, lightheadedness  Multiple BMs and flatus    OBJECTIVE:    Vital Signs Last 24 Hrs  T(C): 36.6 (15 Oct 2021 21:25), Max: 37.2 (15 Oct 2021 12:54)  T(F): 97.9 (15 Oct 2021 21:25), Max: 99 (15 Oct 2021 12:54)  HR: 76 (15 Oct 2021 21:25) (63 - 90)  BP: 104/62 (15 Oct 2021 21:25) (104/62 - 155/99)  BP(mean): --  RR: 18 (15 Oct 2021 21:25) (12 - 20)  SpO2: 100% (15 Oct 2021 21:25) (96% - 100%)I&O's Detail    14 Oct 2021 07:01  -  15 Oct 2021 07:00  --------------------------------------------------------  IN:    dextrose 5% + sodium chloride 0.45% w/ Additives: 1200 mL    IV PiggyBack: 300 mL  Total IN: 1500 mL    OUT:    Oral Fluid: 0 mL    Voided (mL): 200 mL  Total OUT: 200 mL    Total NET: 1300 mL      15 Oct 2021 07:01  -  16 Oct 2021 00:18  --------------------------------------------------------  IN:  Total IN: 0 mL    OUT:    Oral Fluid: 0 mL    Voided (mL): 250 mL  Total OUT: 250 mL    Total NET: -250 mL      MEDICATIONS  (STANDING):  ciprofloxacin   IVPB      ciprofloxacin   IVPB 400 milliGRAM(s) IV Intermittent every 12 hours  dextrose 5% + sodium chloride 0.45% with potassium chloride 20 mEq/L 1000 milliLiter(s) (100 mL/Hr) IV Continuous <Continuous>  enoxaparin Injectable 40 milliGRAM(s) SubCutaneous every 24 hours  HYDROmorphone  Injectable 0.25 milliGRAM(s) IV Push once  metroNIDAZOLE  IVPB 500 milliGRAM(s) IV Intermittent every 8 hours  metroNIDAZOLE  IVPB      mineral oil enema 133 milliLiter(s) Rectal once  polyethylene glycol 3350 17 Gram(s) Oral daily  senna 2 Tablet(s) Oral at bedtime    MEDICATIONS  (PRN):      PHYSICAL EXAM:  Constitutional: A&Ox3, NAD  Respiratory: Unlabored breathing  Abdomen: Soft, mildly distended, NTTP. No rebound or guarding.  Extremities: WWP, ATWOOD spontaneously    LABS:                        10.4   8.40  )-----------( 349      ( 15 Oct 2021 07:10 )             32.3     10-15    137  |  102  |  5<L>  ----------------------------<  99  3.6   |  20<L>  |  0.58    Ca    8.8      15 Oct 2021 07:10  Phos  2.8     10-15  Mg     1.9     10-15                IMAGING:

## 2021-10-16 NOTE — PROGRESS NOTE ADULT - ASSESSMENT
Assessment:  65F on daily calcium 1200 coming into the ED presenting with 3 weeks abdominal pain, constipation, and distension. Concern for stercoral colitis vs. mass in rectum. Most likely large stool burden from constipation. Patient now s/p flexible sigmoidoscopy for calcified stool mass 26cm from anal verge. Unable to endoscopically remove the mass (10/15).      Plan:  - s/p flex sig- unresectable  - attempting to clear with Golytely prep  - Possible OR today  - Cipro/Flagyl  - NPO    [place] Team Surgery  [id number]  Assessment:  65F on daily calcium 1200 coming into the ED presenting with 3 weeks abdominal pain, constipation, and distension. Concern for stercoral colitis vs. mass in rectum. Most likely large stool burden from constipation. Patient now s/p flexible sigmoidoscopy for calcified stool mass 26cm from anal verge. Unable to endoscopically remove the mass (10/15).      Plan:  - s/p flex sig- unresectable  - attempting to clear with Golytely prep  - Possible OR today or tomorrow  - Cipro/Flagyl  - NPO    [place] Team Surgery  [id number]

## 2021-10-16 NOTE — PROGRESS NOTE ADULT - ASSESSMENT
66yo F PMH HLD presents with abdominal pain, constipation and distention x 3 weeks c/f fecalith resulting in significant constipation, s/p flex sig w/ attempt to dislodge fecalith unsuccessful, currently drinking golytely    Recommendation:  - c/w golytely prep, if unable to dislodge fecalith may need surgical intervention  - NPO  - IVF  - rest of care per surgery. No plans for further endoscopic evaluation    Jaime Min, PGY5  Gastroenterology/Hepatology Fellow  Available on [employer] Teams  [phone] ([employer] Short Range Pager)  [phone] (Long Range Pager)    After 5pm, please contact the on-call GI fellow. [phone]   66yo F PMH HLD presents with abdominal pain, constipation and distention x 3 weeks c/f fecalith resulting in significant constipation, s/p flex sig w/ attempt to dislodge fecalith unsuccessful, currently drinking golytely    Recommendation:  - c/w moviprep, if unable to dislodge fecalith may need surgical intervention  - NPO  - IVF  - rest of care per surgery. No plans for further endoscopic evaluation    Jaime Min, PGY5  Gastroenterology/Hepatology Fellow  Available on [employer] Teams  [phone] ([employer] Short Range Pager)  [phone] (Long Range Pager)    After 5pm, please contact the on-call GI fellow. [phone]

## 2021-10-16 NOTE — PROGRESS NOTE ADULT - SUBJECTIVE AND OBJECTIVE BOX
Gastroenterology/Hepatology Progress Note      Interval Events:   - yesterday flex sig attempted to debulk stool but unsuccessful  - overnight HDS  - began golytely prep overnight, no bowel movement yet    Allergies:  No Known Allergies      Hospital Medications:  ciprofloxacin   IVPB      ciprofloxacin   IVPB 400 milliGRAM(s) IV Intermittent every 12 hours  dextrose 5% + sodium chloride 0.45% with potassium chloride 20 mEq/L 1000 milliLiter(s) IV Continuous <Continuous>  enoxaparin Injectable 40 milliGRAM(s) SubCutaneous every 24 hours  HYDROmorphone  Injectable 0.25 milliGRAM(s) IV Push once  metroNIDAZOLE  IVPB 500 milliGRAM(s) IV Intermittent every 8 hours  metroNIDAZOLE  IVPB      mineral oil enema 133 milliLiter(s) Rectal once  polyethylene glycol 3350 17 Gram(s) Oral daily  senna 2 Tablet(s) Oral at bedtime        PHYSICAL EXAM:   Vital Signs:  Vital Signs Last 24 Hrs  T(C): 36.7 (16 Oct 2021 04:20), Max: 37.2 (15 Oct 2021 12:54)  T(F): 98 (16 Oct 2021 04:20), Max: 99 (15 Oct 2021 12:54)  HR: 73 (16 Oct 2021 04:20) (72 - 90)  BP: 116/63 (16 Oct 2021 04:20) (104/62 - 155/99)  BP(mean): --  RR: 18 (16 Oct 2021 04:20) (12 - 20)  SpO2: 99% (16 Oct 2021 04:20) (96% - 100%)  Daily Height in cm: 162.56 (15 Oct 2021 15:12)    Daily     GENERAL:  No acute distress  HEENT:  NCAT, no scleral icterus  CHEST: no resp distress  HEART:  RRR  ABDOMEN:  Soft, non-tender, non-distended, normoactive bowel sounds, no masses  EXTREMITIES:  No cyanosis, clubbing, or edema  SKIN:  No rash/erythema/ecchymoses/petechiae/wounds/abscess/warm/dry  NEURO:  Alert and oriented x 3, no asterixis, no tremor    LABS:                        10.6   7.66  )-----------( 333      ( 16 Oct 2021 04:33 )             33.9     Mean Cell Volume: 82.9 fl (10-16-21 @ 04:33)    10-16    142  |  109<H>  |  5<L>  ----------------------------<  114<H>  3.9   |  19<L>  |  0.56    Ca    8.6      16 Oct 2021 04:33  Phos  3.0     10-16  Mg     2.1     10-16        PT/INR - ( 16 Oct 2021 04:33 )   PT: 15.3 sec;   INR: 1.29 ratio         PTT - ( 16 Oct 2021 04:33 )  PTT:25.7 sec          Imaging:  Flex sig 10/15/21  Findings:       The perianal and digital rectal examinations were normal.       A spherical, solid dark green fecolith was found in the sigmoid colon at 26cm from anal        verge, precluding visualization. The stool was approximetly 4cm in diameter (however the        length/depth was not able to be assessed. The stool was extremely hard to palpation. The        scope was unable to traverse the fecolith as it completely obstructed the bowel. The mucosa        around the fecolith was deeply ulcerated and friable. The lumen in this regionwas dilated        and the fecolith was not fixed (was able to move several centimeters within this portion of        dilated lumen. The lumen distal to this region was slightly narrowed in caliber with normal        mucosa, and the fecolith was thusunable to move distally. The was a small amount of liquid        stool in the distal sigmoid colon and rectum.       Removal of stool was extensively attempted with a large stiff snares, a raptor forcep, and        gregory net without success. Bipolar cautery was attempted to break apart the hard outer shell        of the fecolith without success. A needle was attempted to break through the outer shell and        inject the fecolith with fluid, which also was not successful. Finally, a tissue helix was        used to mehcanically drill into the shell, which was able to break apart a small area and        expose a small amount of soft stool, but further drilling was not successful in breaking        apart the fecolith or extracting it.                                                                                              Impression:          - 4-5cm solid fecolith in the sigmoid colon at approximately 26-30cm from the                        anal verge, stuck within a region ofdeeply ulcerated, friable mucosa. Unable                        to be broken apart or extracted.           Gastroenterology/Hepatology Progress Note      Interval Events:   - yesterday flex sig attempted to debulk stool but unsuccessful  - overnight HDS  - began moviprep overnight, drank almost two bottles, very small bowel movement     Allergies:  No Known Allergies      Hospital Medications:  ciprofloxacin   IVPB      ciprofloxacin   IVPB 400 milliGRAM(s) IV Intermittent every 12 hours  dextrose 5% + sodium chloride 0.45% with potassium chloride 20 mEq/L 1000 milliLiter(s) IV Continuous <Continuous>  enoxaparin Injectable 40 milliGRAM(s) SubCutaneous every 24 hours  HYDROmorphone  Injectable 0.25 milliGRAM(s) IV Push once  metroNIDAZOLE  IVPB 500 milliGRAM(s) IV Intermittent every 8 hours  metroNIDAZOLE  IVPB      mineral oil enema 133 milliLiter(s) Rectal once  polyethylene glycol 3350 17 Gram(s) Oral daily  senna 2 Tablet(s) Oral at bedtime        PHYSICAL EXAM:   Vital Signs:  Vital Signs Last 24 Hrs  T(C): 36.7 (16 Oct 2021 04:20), Max: 37.2 (15 Oct 2021 12:54)  T(F): 98 (16 Oct 2021 04:20), Max: 99 (15 Oct 2021 12:54)  HR: 73 (16 Oct 2021 04:20) (72 - 90)  BP: 116/63 (16 Oct 2021 04:20) (104/62 - 155/99)  BP(mean): --  RR: 18 (16 Oct 2021 04:20) (12 - 20)  SpO2: 99% (16 Oct 2021 04:20) (96% - 100%)  Daily Height in cm: 162.56 (15 Oct 2021 15:12)    Daily     GENERAL:  No acute distress  HEENT:  NCAT, no scleral icterus  CHEST: no resp distress  HEART:  RRR  ABDOMEN:  Soft, non-tender, non-distended, normoactive bowel sounds, no masses  EXTREMITIES:  No cyanosis, clubbing, or edema  SKIN:  No rash/erythema/ecchymoses/petechiae/wounds/abscess/warm/dry  NEURO:  Alert and oriented x 3, no asterixis, no tremor    LABS:                        10.6   7.66  )-----------( 333      ( 16 Oct 2021 04:33 )             33.9     Mean Cell Volume: 82.9 fl (10-16-21 @ 04:33)    10-16    142  |  109<H>  |  5<L>  ----------------------------<  114<H>  3.9   |  19<L>  |  0.56    Ca    8.6      16 Oct 2021 04:33  Phos  3.0     10-16  Mg     2.1     10-16        PT/INR - ( 16 Oct 2021 04:33 )   PT: 15.3 sec;   INR: 1.29 ratio         PTT - ( 16 Oct 2021 04:33 )  PTT:25.7 sec          Imaging:  Flex sig 10/15/21  Findings:       The perianal and digital rectal examinations were normal.       A spherical, solid dark green fecolith was found in the sigmoid colon at 26cm from anal        verge, precluding visualization. The stool was approximetly 4cm in diameter (however the        length/depth was not able to be assessed. The stool was extremely hard to palpation. The        scope was unable to traverse the fecolith as it completely obstructed the bowel. The mucosa        around the fecolith was deeply ulcerated and friable. The lumen in this regionwas dilated        and the fecolith was not fixed (was able to move several centimeters within this portion of        dilated lumen. The lumen distal to this region was slightly narrowed in caliber with normal        mucosa, and the fecolith was thusunable to move distally. The was a small amount of liquid        stool in the distal sigmoid colon and rectum.       Removal of stool was extensively attempted with a large stiff snares, a raptor forcep, and        gregory net without success. Bipolar cautery was attempted to break apart the hard outer shell        of the fecolith without success. A needle was attempted to break through the outer shell and        inject the fecolith with fluid, which also was not successful. Finally, a tissue helix was        used to mehcanically drill into the shell, which was able to break apart a small area and        expose a small amount of soft stool, but further drilling was not successful in breaking        apart the fecolith or extracting it.                                                                                              Impression:          - 4-5cm solid fecolith in the sigmoid colon at approximately 26-30cm from the                        anal verge, stuck within a region ofdeeply ulcerated, friable mucosa. Unable                        to be broken apart or extracted.           Gastroenterology/Hepatology Progress Note      Interval Events:   - yesterday flex sig attempted to remove stool ball but unsuccessful  - overnight HDS  - began moviprep overnight, drank almost two bottles, very small bowel movement     Allergies:  No Known Allergies      Hospital Medications:  ciprofloxacin   IVPB      ciprofloxacin   IVPB 400 milliGRAM(s) IV Intermittent every 12 hours  dextrose 5% + sodium chloride 0.45% with potassium chloride 20 mEq/L 1000 milliLiter(s) IV Continuous <Continuous>  enoxaparin Injectable 40 milliGRAM(s) SubCutaneous every 24 hours  HYDROmorphone  Injectable 0.25 milliGRAM(s) IV Push once  metroNIDAZOLE  IVPB 500 milliGRAM(s) IV Intermittent every 8 hours  metroNIDAZOLE  IVPB      mineral oil enema 133 milliLiter(s) Rectal once  polyethylene glycol 3350 17 Gram(s) Oral daily  senna 2 Tablet(s) Oral at bedtime        PHYSICAL EXAM:   Vital Signs:  Vital Signs Last 24 Hrs  T(C): 36.7 (16 Oct 2021 04:20), Max: 37.2 (15 Oct 2021 12:54)  T(F): 98 (16 Oct 2021 04:20), Max: 99 (15 Oct 2021 12:54)  HR: 73 (16 Oct 2021 04:20) (72 - 90)  BP: 116/63 (16 Oct 2021 04:20) (104/62 - 155/99)  BP(mean): --  RR: 18 (16 Oct 2021 04:20) (12 - 20)  SpO2: 99% (16 Oct 2021 04:20) (96% - 100%)  Daily Height in cm: 162.56 (15 Oct 2021 15:12)    Daily     GENERAL:  No acute distress  HEENT:  NCAT, no scleral icterus  CHEST: no resp distress  HEART:  RRR  ABDOMEN:  Soft, non-tender, non-distended, normoactive bowel sounds, no masses  EXTREMITIES:  No cyanosis, clubbing, or edema  SKIN:  No rash/erythema/ecchymoses/petechiae/wounds/abscess/warm/dry  NEURO:  Alert and oriented x 3, no asterixis, no tremor    LABS:                        10.6   7.66  )-----------( 333      ( 16 Oct 2021 04:33 )             33.9     Mean Cell Volume: 82.9 fl (10-16-21 @ 04:33)    10-16    142  |  109<H>  |  5<L>  ----------------------------<  114<H>  3.9   |  19<L>  |  0.56    Ca    8.6      16 Oct 2021 04:33  Phos  3.0     10-16  Mg     2.1     10-16        PT/INR - ( 16 Oct 2021 04:33 )   PT: 15.3 sec;   INR: 1.29 ratio         PTT - ( 16 Oct 2021 04:33 )  PTT:25.7 sec          Imaging:  Flex sig 10/15/21  Findings:       The perianal and digital rectal examinations were normal.       A spherical, solid dark green fecolith was found in the sigmoid colon at 26cm from anal        verge, precluding visualization. The stool was approximetly 4cm in diameter (however the        length/depth was not able to be assessed. The stool was extremely hard to palpation. The        scope was unable to traverse the fecolith as it completely obstructed the bowel. The mucosa        around the fecolith was deeply ulcerated and friable. The lumen in this regionwas dilated        and the fecolith was not fixed (was able to move several centimeters within this portion of        dilated lumen. The lumen distal to this region was slightly narrowed in caliber with normal        mucosa, and the fecolith was thusunable to move distally. The was a small amount of liquid        stool in the distal sigmoid colon and rectum.       Removal of stool was extensively attempted with a large stiff snares, a raptor forcep, and        gregory net without success. Bipolar cautery was attempted to break apart the hard outer shell        of the fecolith without success. A needle was attempted to break through the outer shell and        inject the fecolith with fluid, which also was not successful. Finally, a tissue helix was        used to mehcanically drill into the shell, which was able to break apart a small area and        expose a small amount of soft stool, but further drilling was not successful in breaking        apart the fecolith or extracting it.                                                                                              Impression:          - 4-5cm solid fecolith in the sigmoid colon at approximately 26-30cm from the                        anal verge, stuck within a region ofdeeply ulcerated, friable mucosa. Unable                        to be broken apart or extracted.

## 2021-10-17 ENCOUNTER — RESULT REVIEW (OUTPATIENT)
Age: 66
End: 2021-10-17

## 2021-10-17 LAB
ANION GAP SERPL CALC-SCNC: 14 MMOL/L — SIGNIFICANT CHANGE UP (ref 5–17)
APTT BLD: 28.3 SEC — SIGNIFICANT CHANGE UP (ref 27.5–35.5)
BUN SERPL-MCNC: 5 MG/DL — LOW (ref 7–23)
CALCIUM SERPL-MCNC: 9 MG/DL — SIGNIFICANT CHANGE UP (ref 8.4–10.5)
CHLORIDE SERPL-SCNC: 102 MMOL/L — SIGNIFICANT CHANGE UP (ref 96–108)
CO2 SERPL-SCNC: 21 MMOL/L — LOW (ref 22–31)
CREAT SERPL-MCNC: 0.58 MG/DL — SIGNIFICANT CHANGE UP (ref 0.5–1.3)
GLUCOSE SERPL-MCNC: 126 MG/DL — HIGH (ref 70–99)
HCT VFR BLD CALC: 36.4 % — SIGNIFICANT CHANGE UP (ref 34.5–45)
HGB BLD-MCNC: 11.1 G/DL — LOW (ref 11.5–15.5)
INR BLD: 1.28 RATIO — HIGH (ref 0.88–1.16)
MAGNESIUM SERPL-MCNC: 2 MG/DL — SIGNIFICANT CHANGE UP (ref 1.6–2.6)
MCHC RBC-ENTMCNC: 25.2 PG — LOW (ref 27–34)
MCHC RBC-ENTMCNC: 30.5 GM/DL — LOW (ref 32–36)
MCV RBC AUTO: 82.7 FL — SIGNIFICANT CHANGE UP (ref 80–100)
NRBC # BLD: 0 /100 WBCS — SIGNIFICANT CHANGE UP (ref 0–0)
PHOSPHATE SERPL-MCNC: 2.4 MG/DL — LOW (ref 2.5–4.5)
PLATELET # BLD AUTO: 410 K/UL — HIGH (ref 150–400)
POTASSIUM SERPL-MCNC: 3.5 MMOL/L — SIGNIFICANT CHANGE UP (ref 3.5–5.3)
POTASSIUM SERPL-SCNC: 3.5 MMOL/L — SIGNIFICANT CHANGE UP (ref 3.5–5.3)
PROTHROM AB SERPL-ACNC: 15.2 SEC — HIGH (ref 10.6–13.6)
RBC # BLD: 4.4 M/UL — SIGNIFICANT CHANGE UP (ref 3.8–5.2)
RBC # FLD: 14.1 % — SIGNIFICANT CHANGE UP (ref 10.3–14.5)
SODIUM SERPL-SCNC: 137 MMOL/L — SIGNIFICANT CHANGE UP (ref 135–145)
WBC # BLD: 7.31 K/UL — SIGNIFICANT CHANGE UP (ref 3.8–10.5)
WBC # FLD AUTO: 7.31 K/UL — SIGNIFICANT CHANGE UP (ref 3.8–10.5)

## 2021-10-17 PROCEDURE — 88300 SURGICAL PATH GROSS: CPT | Mod: 26

## 2021-10-17 PROCEDURE — 74176 CT ABD & PELVIS W/O CONTRAST: CPT | Mod: 26

## 2021-10-17 RX ORDER — SODIUM CHLORIDE 9 MG/ML
1000 INJECTION, SOLUTION INTRAVENOUS
Refills: 0 | Status: DISCONTINUED | OUTPATIENT
Start: 2021-10-17 | End: 2021-10-18

## 2021-10-17 RX ORDER — KETOROLAC TROMETHAMINE 30 MG/ML
15 SYRINGE (ML) INJECTION EVERY 6 HOURS
Refills: 0 | Status: DISCONTINUED | OUTPATIENT
Start: 2021-10-17 | End: 2021-10-18

## 2021-10-17 RX ORDER — POTASSIUM PHOSPHATE, MONOBASIC POTASSIUM PHOSPHATE, DIBASIC 236; 224 MG/ML; MG/ML
30 INJECTION, SOLUTION INTRAVENOUS ONCE
Refills: 0 | Status: COMPLETED | OUTPATIENT
Start: 2021-10-17 | End: 2021-10-17

## 2021-10-17 RX ORDER — HYDROMORPHONE HYDROCHLORIDE 2 MG/ML
0.5 INJECTION INTRAMUSCULAR; INTRAVENOUS; SUBCUTANEOUS
Refills: 0 | Status: DISCONTINUED | OUTPATIENT
Start: 2021-10-17 | End: 2021-10-17

## 2021-10-17 RX ORDER — HYDROMORPHONE HYDROCHLORIDE 2 MG/ML
0.25 INJECTION INTRAMUSCULAR; INTRAVENOUS; SUBCUTANEOUS EVERY 6 HOURS
Refills: 0 | Status: DISCONTINUED | OUTPATIENT
Start: 2021-10-17 | End: 2021-10-18

## 2021-10-17 RX ORDER — INFLUENZA VIRUS VACCINE 15; 15; 15; 15 UG/.5ML; UG/.5ML; UG/.5ML; UG/.5ML
0.5 SUSPENSION INTRAMUSCULAR ONCE
Refills: 0 | Status: COMPLETED | OUTPATIENT
Start: 2021-10-17 | End: 2021-10-17

## 2021-10-17 RX ORDER — ACETAMINOPHEN 500 MG
1000 TABLET ORAL EVERY 6 HOURS
Refills: 0 | Status: COMPLETED | OUTPATIENT
Start: 2021-10-17 | End: 2021-10-18

## 2021-10-17 RX ORDER — ONDANSETRON 8 MG/1
4 TABLET, FILM COATED ORAL ONCE
Refills: 0 | Status: COMPLETED | OUTPATIENT
Start: 2021-10-17 | End: 2021-10-17

## 2021-10-17 RX ORDER — OXYCODONE HYDROCHLORIDE 5 MG/1
2.5 TABLET ORAL EVERY 4 HOURS
Refills: 0 | Status: DISCONTINUED | OUTPATIENT
Start: 2021-10-17 | End: 2021-10-18

## 2021-10-17 RX ORDER — IBUPROFEN 200 MG
400 TABLET ORAL EVERY 6 HOURS
Refills: 0 | Status: DISCONTINUED | OUTPATIENT
Start: 2021-10-17 | End: 2021-10-19

## 2021-10-17 RX ORDER — HEPARIN SODIUM 5000 [USP'U]/ML
5000 INJECTION INTRAVENOUS; SUBCUTANEOUS EVERY 8 HOURS
Refills: 0 | Status: DISCONTINUED | OUTPATIENT
Start: 2021-10-18 | End: 2021-10-23

## 2021-10-17 RX ORDER — OXYCODONE HYDROCHLORIDE 5 MG/1
5 TABLET ORAL EVERY 4 HOURS
Refills: 0 | Status: DISCONTINUED | OUTPATIENT
Start: 2021-10-17 | End: 2021-10-18

## 2021-10-17 RX ADMIN — Medication 250 MILLIGRAM(S): at 00:39

## 2021-10-17 RX ADMIN — POTASSIUM PHOSPHATE, MONOBASIC POTASSIUM PHOSPHATE, DIBASIC 83.33 MILLIMOLE(S): 236; 224 INJECTION, SOLUTION INTRAVENOUS at 22:51

## 2021-10-17 RX ADMIN — DEXTROSE MONOHYDRATE, SODIUM CHLORIDE, AND POTASSIUM CHLORIDE 100 MILLILITER(S): 50; .745; 4.5 INJECTION, SOLUTION INTRAVENOUS at 08:00

## 2021-10-17 RX ADMIN — ONDANSETRON 4 MILLIGRAM(S): 8 TABLET, FILM COATED ORAL at 17:32

## 2021-10-17 RX ADMIN — SODIUM CHLORIDE 100 MILLILITER(S): 9 INJECTION, SOLUTION INTRAVENOUS at 18:10

## 2021-10-17 RX ADMIN — Medication 250 MILLIGRAM(S): at 00:02

## 2021-10-17 RX ADMIN — Medication 100 MILLIGRAM(S): at 08:00

## 2021-10-17 RX ADMIN — Medication 15 MILLIGRAM(S): at 23:11

## 2021-10-17 RX ADMIN — ENOXAPARIN SODIUM 40 MILLIGRAM(S): 100 INJECTION SUBCUTANEOUS at 06:05

## 2021-10-17 RX ADMIN — Medication 15 MILLIGRAM(S): at 23:41

## 2021-10-17 RX ADMIN — Medication 100 MILLIGRAM(S): at 00:50

## 2021-10-17 NOTE — BRIEF OPERATIVE NOTE - OPERATION/FINDINGS
The fecalith was mobilized into splenic flexure which was taken down to allow its passage into transverse colon. The transverse colon was externalized to allow colotomy for retrieval, the colotomy was repaired in 2 layers in transverse fashion. Colonoscopy performed, re-visualized the ulcer without evidence of stenosis

## 2021-10-17 NOTE — PROGRESS NOTE ADULT - SUBJECTIVE AND OBJECTIVE BOX
[facility] Daily Progress Note    INTERVAL EVENTS: Covid negative. Patient is currently drinking Golytely prep but has had some bouts of nausea and abdominal cramps. Patient is preoped for OR on 10/17    SUBJECTIVE: Patient seen and examined at bedside with surgical team. Patient complains of nausea 2/2 golytely prep but denies acute onset abdominal pain, fevers, chills, vomiting, SOB, CP, lightheadedness  Multiple BMs and flatus    Objective:  Vital Signs  T(C): 37.1 (10-16 @ 22:00), Max: 37.1 (10-16 @ 22:00)  HR: 81 (10-16 @ 22:00) (73 - 85)  BP: 110/62 (10-16 @ 22:00) (110/62 - 157/68)  RR: 18 (10-16 @ 22:00) (18 - 18)  SpO2: 98% (10-16 @ 22:00) (94% - 99%)  10-15-21 @ 07:01  -  10-16-21 @ 07:00  --------------------------------------------------------  IN:  Total IN: 0 mL    OUT:    Voided (mL): 250 mL  Total OUT: 250 mL    Total NET: -250 mL      10-16-21 @ 07:01  -  10-17-21 @ 00:21  --------------------------------------------------------  IN:  Total IN: 0 mL    OUT:    Voided (mL): 1200 mL  Total OUT: 1200 mL    Total NET: -1200 mL          Physical Exam:  GEN: resting in bed comfortably in NAD  RESP: no increased WOB  ABD: soft, non-distended, non-tender without rebound tenderness or guarding  EXTR: warm, well-perfused without gross deformities; spontaneous movement in b/l U/L extrem  NEURO: AAOx4    Labs:                        10.6   7.66  )-----------( 333      ( 16 Oct 2021 04:33 )             33.9   10-16    142  |  109<H>  |  5<L>  ----------------------------<  114<H>  3.9   |  19<L>  |  0.56    Ca    8.6      16 Oct 2021 04:33  Phos  3.0     10-16  Mg     2.1     10-16      CAPILLARY BLOOD GLUCOSE          Medications:   MEDICATIONS  (STANDING):  acetaminophen  IVPB .. 1000 milliGRAM(s) IV Intermittent every 6 hours  ciprofloxacin     Tablet 250 milliGRAM(s) Oral <User Schedule>  ciprofloxacin   IVPB 400 milliGRAM(s) IV Intermittent every 12 hours  ciprofloxacin   IVPB      dextrose 5% + sodium chloride 0.45% with potassium chloride 20 mEq/L 1000 milliLiter(s) (100 mL/Hr) IV Continuous <Continuous>  enoxaparin Injectable 40 milliGRAM(s) SubCutaneous every 24 hours  HYDROmorphone  Injectable 0.25 milliGRAM(s) IV Push once  metroNIDAZOLE  IVPB 500 milliGRAM(s) IV Intermittent every 8 hours  metroNIDAZOLE  IVPB      mineral oil enema 133 milliLiter(s) Rectal once  polyethylene glycol 3350 17 Gram(s) Oral daily  senna 2 Tablet(s) Oral at bedtime    MEDICATIONS  (PRN):  acetaminophen   Tablet .. 650 milliGRAM(s) Oral every 6 hours PRN Moderate Pain (4 - 6)  ondansetron Injectable 4 milliGRAM(s) IV Push every 8 hours PRN Nausea      Imaging:

## 2021-10-17 NOTE — PROGRESS NOTE ADULT - ASSESSMENT
Assessment:  65F on daily calcium 1200 coming into the ED presenting with 3 weeks abdominal pain, constipation, and distension. Concern for stercoral colitis vs. mass in rectum. Most likely large stool burden from constipation. Patient now s/p flexible sigmoidoscopy for calcified stool mass 26cm from anal verge. Unable to endoscopically remove the mass (10/15). Patient is going to OR this AM for laparoscopy, possible laparotomy with bowel resection, ostomy and/or on table lavage.      Plan:  - OR today or tomorrow  - Cipro/Flagyl  - NPO    [place] [facility]  [id number]

## 2021-10-17 NOTE — BRIEF OPERATIVE NOTE - NSICDXBRIEFPOSTOP_GEN_ALL_CORE_FT
POST-OP DIAGNOSIS:  Large bowel obstruction 18-Oct-2021 05:25:14  Jovon Levine  Fecal impaction 18-Oct-2021 05:25:19  Jovon Levine

## 2021-10-17 NOTE — BRIEF OPERATIVE NOTE - NSICDXBRIEFPROCEDURE_GEN_ALL_CORE_FT
PROCEDURES:  Mobilization of splenic flexure 18-Oct-2021 05:23:29  Jovon Levine  Colotomy 18-Oct-2021 05:24:01  Jovon Levine  Removal of fecal impaction 18-Oct-2021 05:24:35  Jovon Levine

## 2021-10-17 NOTE — CHART NOTE - NSCHARTNOTEFT_GEN_A_CORE
POST-OPERATIVE NOTE    Subjective:  Patient is s/p laparoscopic colotomy and excision of obstructing fecalith. Patient states pain is controlled. States to have mild nausea but not vomiting. Denies passing flatus and denies bowel movements after surgery. Recovering appropriately.     Vital Signs Last 24 Hrs  T(C): 36.9 (17 Oct 2021 22:31), Max: 98 (17 Oct 2021 19:31)  T(F): 98.4 (17 Oct 2021 22:31), Max: 208.4 (17 Oct 2021 19:31)  HR: 79 (17 Oct 2021 22:31) (64 - 85)  BP: 116/75 (17 Oct 2021 22:31) (110/56 - 139/89)  BP(mean): 79 (17 Oct 2021 18:30) (78 - 85)  RR: 18 (17 Oct 2021 22:31) (16 - 18)  SpO2: 94% (17 Oct 2021 22:31) (94% - 100%)  I&O's Detail    16 Oct 2021 07:01  -  17 Oct 2021 07:00  --------------------------------------------------------  IN:  Total IN: 0 mL    OUT:    Oral Fluid: 0 mL    Voided (mL): 1600 mL  Total OUT: 1600 mL    Total NET: -1600 mL      17 Oct 2021 07:01  -  17 Oct 2021 22:59  --------------------------------------------------------  IN:    IV PiggyBack: 100 mL  Total IN: 100 mL    OUT:    Indwelling Catheter - Urethral (mL): 600 mL    Oral Fluid: 0 mL  Total OUT: 600 mL    Total NET: -500 mL      PAST MEDICAL & SURGICAL HISTORY:  No pertinent past medical history    H/O total knee replacement, left      Physical Exam:    General: NAD, resting comfortably in bed    Pulmonary: Nonlabored breathing, no respiratory distress    Abdominal: soft, mild tenderness to palpation, mild distension. Infraumbilical dressing has scant dried blood on the inferior half of the dressing. Port site dressings are c/d/i    Extremities: WWP      LABS:                        11.1   7.31  )-----------( 410      ( 17 Oct 2021 07:28 )             36.4     10-17    137  |  102  |  5<L>  ----------------------------<  126<H>  3.5   |  21<L>  |  0.58    Ca    9.0      17 Oct 2021 07:25  Phos  2.4     10-17  Mg     2.0     10-17      PT/INR - ( 17 Oct 2021 07:32 )   PT: 15.2 sec;   INR: 1.28 ratio         PTT - ( 17 Oct 2021 07:32 )  PTT:28.3 sec  CAPILLARY BLOOD GLUCOSE        Assessment:  The patient is a 65y Female who is now several hours post-op from a laparoscopic colotomy and excision of obstructing fecalith.     Plan:  - Multimodal pain control as needed  - Diet: Clear liquids  - IVF: Lactated Ringers at 100cc/hour    - DVT ppx: Subcutaneous Heparin  - OOB and ambulating as tolerated  - Awaiting return of bowel function   - F/u AM labs

## 2021-10-18 LAB
ANION GAP SERPL CALC-SCNC: 13 MMOL/L — SIGNIFICANT CHANGE UP (ref 5–17)
BUN SERPL-MCNC: 6 MG/DL — LOW (ref 7–23)
CALCIUM SERPL-MCNC: 8.2 MG/DL — LOW (ref 8.4–10.5)
CHLORIDE SERPL-SCNC: 103 MMOL/L — SIGNIFICANT CHANGE UP (ref 96–108)
CO2 SERPL-SCNC: 21 MMOL/L — LOW (ref 22–31)
CREAT SERPL-MCNC: 0.6 MG/DL — SIGNIFICANT CHANGE UP (ref 0.5–1.3)
GLUCOSE SERPL-MCNC: 90 MG/DL — SIGNIFICANT CHANGE UP (ref 70–99)
HCT VFR BLD CALC: 32.4 % — LOW (ref 34.5–45)
HGB BLD-MCNC: 9.9 G/DL — LOW (ref 11.5–15.5)
MAGNESIUM SERPL-MCNC: 2 MG/DL — SIGNIFICANT CHANGE UP (ref 1.6–2.6)
MCHC RBC-ENTMCNC: 25.4 PG — LOW (ref 27–34)
MCHC RBC-ENTMCNC: 30.6 GM/DL — LOW (ref 32–36)
MCV RBC AUTO: 83.1 FL — SIGNIFICANT CHANGE UP (ref 80–100)
NRBC # BLD: 0 /100 WBCS — SIGNIFICANT CHANGE UP (ref 0–0)
PHOSPHATE SERPL-MCNC: 4.4 MG/DL — SIGNIFICANT CHANGE UP (ref 2.5–4.5)
PLATELET # BLD AUTO: 330 K/UL — SIGNIFICANT CHANGE UP (ref 150–400)
POTASSIUM SERPL-MCNC: 4.5 MMOL/L — SIGNIFICANT CHANGE UP (ref 3.5–5.3)
POTASSIUM SERPL-SCNC: 4.5 MMOL/L — SIGNIFICANT CHANGE UP (ref 3.5–5.3)
RBC # BLD: 3.9 M/UL — SIGNIFICANT CHANGE UP (ref 3.8–5.2)
RBC # FLD: 14.3 % — SIGNIFICANT CHANGE UP (ref 10.3–14.5)
SODIUM SERPL-SCNC: 137 MMOL/L — SIGNIFICANT CHANGE UP (ref 135–145)
WBC # BLD: 11.56 K/UL — HIGH (ref 3.8–10.5)
WBC # FLD AUTO: 11.56 K/UL — HIGH (ref 3.8–10.5)

## 2021-10-18 PROCEDURE — 99232 SBSQ HOSP IP/OBS MODERATE 35: CPT | Mod: GC

## 2021-10-18 RX ORDER — METRONIDAZOLE 500 MG
500 TABLET ORAL EVERY 8 HOURS
Refills: 0 | Status: DISCONTINUED | OUTPATIENT
Start: 2021-10-18 | End: 2021-10-23

## 2021-10-18 RX ORDER — CHLORHEXIDINE GLUCONATE 213 G/1000ML
1 SOLUTION TOPICAL
Refills: 0 | Status: DISCONTINUED | OUTPATIENT
Start: 2021-10-18 | End: 2021-10-19

## 2021-10-18 RX ORDER — CIPROFLOXACIN LACTATE 400MG/40ML
400 VIAL (ML) INTRAVENOUS EVERY 12 HOURS
Refills: 0 | Status: DISCONTINUED | OUTPATIENT
Start: 2021-10-18 | End: 2021-10-23

## 2021-10-18 RX ORDER — TRAMADOL HYDROCHLORIDE 50 MG/1
25 TABLET ORAL ONCE
Refills: 0 | Status: DISCONTINUED | OUTPATIENT
Start: 2021-10-18 | End: 2021-10-18

## 2021-10-18 RX ORDER — ACETAMINOPHEN 500 MG
650 TABLET ORAL EVERY 6 HOURS
Refills: 0 | Status: DISCONTINUED | OUTPATIENT
Start: 2021-10-18 | End: 2021-10-19

## 2021-10-18 RX ORDER — SODIUM CHLORIDE 9 MG/ML
3 INJECTION INTRAMUSCULAR; INTRAVENOUS; SUBCUTANEOUS EVERY 8 HOURS
Refills: 0 | Status: DISCONTINUED | OUTPATIENT
Start: 2021-10-18 | End: 2021-10-23

## 2021-10-18 RX ORDER — POLYETHYLENE GLYCOL 3350 17 G/17G
17 POWDER, FOR SOLUTION ORAL
Refills: 0 | Status: DISCONTINUED | OUTPATIENT
Start: 2021-10-18 | End: 2021-10-19

## 2021-10-18 RX ORDER — TRAMADOL HYDROCHLORIDE 50 MG/1
50 TABLET ORAL EVERY 6 HOURS
Refills: 0 | Status: DISCONTINUED | OUTPATIENT
Start: 2021-10-18 | End: 2021-10-23

## 2021-10-18 RX ORDER — TRAMADOL HYDROCHLORIDE 50 MG/1
25 TABLET ORAL EVERY 6 HOURS
Refills: 0 | Status: DISCONTINUED | OUTPATIENT
Start: 2021-10-18 | End: 2021-10-23

## 2021-10-18 RX ORDER — ONDANSETRON 8 MG/1
4 TABLET, FILM COATED ORAL ONCE
Refills: 0 | Status: COMPLETED | OUTPATIENT
Start: 2021-10-18 | End: 2021-10-18

## 2021-10-18 RX ORDER — DEXTROSE MONOHYDRATE, SODIUM CHLORIDE, AND POTASSIUM CHLORIDE 50; .745; 4.5 G/1000ML; G/1000ML; G/1000ML
1000 INJECTION, SOLUTION INTRAVENOUS
Refills: 0 | Status: DISCONTINUED | OUTPATIENT
Start: 2021-10-18 | End: 2021-10-18

## 2021-10-18 RX ADMIN — Medication 15 MILLIGRAM(S): at 12:15

## 2021-10-18 RX ADMIN — HEPARIN SODIUM 5000 UNIT(S): 5000 INJECTION INTRAVENOUS; SUBCUTANEOUS at 21:16

## 2021-10-18 RX ADMIN — TRAMADOL HYDROCHLORIDE 25 MILLIGRAM(S): 50 TABLET ORAL at 17:50

## 2021-10-18 RX ADMIN — Medication 100 MILLIGRAM(S): at 21:29

## 2021-10-18 RX ADMIN — SODIUM CHLORIDE 3 MILLILITER(S): 9 INJECTION INTRAMUSCULAR; INTRAVENOUS; SUBCUTANEOUS at 21:00

## 2021-10-18 RX ADMIN — TRAMADOL HYDROCHLORIDE 50 MILLIGRAM(S): 50 TABLET ORAL at 23:59

## 2021-10-18 RX ADMIN — Medication 1000 MILLIGRAM(S): at 15:09

## 2021-10-18 RX ADMIN — TRAMADOL HYDROCHLORIDE 25 MILLIGRAM(S): 50 TABLET ORAL at 09:02

## 2021-10-18 RX ADMIN — Medication 400 MILLIGRAM(S): at 08:31

## 2021-10-18 RX ADMIN — Medication 650 MILLIGRAM(S): at 22:17

## 2021-10-18 RX ADMIN — Medication 15 MILLIGRAM(S): at 05:40

## 2021-10-18 RX ADMIN — TRAMADOL HYDROCHLORIDE 25 MILLIGRAM(S): 50 TABLET ORAL at 14:41

## 2021-10-18 RX ADMIN — Medication 15 MILLIGRAM(S): at 12:45

## 2021-10-18 RX ADMIN — DEXTROSE MONOHYDRATE, SODIUM CHLORIDE, AND POTASSIUM CHLORIDE 75 MILLILITER(S): 50; .745; 4.5 INJECTION, SOLUTION INTRAVENOUS at 10:20

## 2021-10-18 RX ADMIN — HEPARIN SODIUM 5000 UNIT(S): 5000 INJECTION INTRAVENOUS; SUBCUTANEOUS at 14:42

## 2021-10-18 RX ADMIN — Medication 200 MILLIGRAM(S): at 17:51

## 2021-10-18 RX ADMIN — Medication 400 MILLIGRAM(S): at 14:39

## 2021-10-18 RX ADMIN — TRAMADOL HYDROCHLORIDE 25 MILLIGRAM(S): 50 TABLET ORAL at 09:32

## 2021-10-18 RX ADMIN — Medication 400 MILLIGRAM(S): at 02:30

## 2021-10-18 RX ADMIN — ONDANSETRON 4 MILLIGRAM(S): 8 TABLET, FILM COATED ORAL at 21:26

## 2021-10-18 RX ADMIN — Medication 1000 MILLIGRAM(S): at 09:01

## 2021-10-18 RX ADMIN — SODIUM CHLORIDE 100 MILLILITER(S): 9 INJECTION, SOLUTION INTRAVENOUS at 03:06

## 2021-10-18 RX ADMIN — Medication 15 MILLIGRAM(S): at 05:10

## 2021-10-18 RX ADMIN — TRAMADOL HYDROCHLORIDE 25 MILLIGRAM(S): 50 TABLET ORAL at 01:50

## 2021-10-18 RX ADMIN — Medication 1000 MILLIGRAM(S): at 03:00

## 2021-10-18 RX ADMIN — HEPARIN SODIUM 5000 UNIT(S): 5000 INJECTION INTRAVENOUS; SUBCUTANEOUS at 05:15

## 2021-10-18 RX ADMIN — Medication 650 MILLIGRAM(S): at 22:47

## 2021-10-18 NOTE — PROGRESS NOTE ADULT - ASSESSMENT
64yo F PMH HLD presents with abdominal pain, constipation and distention x 3 weeks c/f fecalith resulting in significant constipation, s/p flex sig w/ attempt to dislodge fecalith unsuccessful.    # Constipation with fecalith: s/p OR with colostomy placement and removal of fecalith    Recommendation:  - appreciate surgery team  - monitor abdominal exam  - diet per surgery team  - pain control  - OOB  - rest of care per surgery; No plans for further endoscopic evaluation

## 2021-10-18 NOTE — PROGRESS NOTE ADULT - ASSESSMENT
Assessment:  65F on daily calcium 1200 coming into the ED presenting with 3 weeks abdominal pain, constipation, and distension. Concern for stercoral colitis vs. mass in rectum. Most likely large stool burden from constipation. Patient now s/p flexible sigmoidoscopy for calcified stool mass 26cm from anal verge. Unable to endoscopically remove the mass (10/15). Patient is now s/p laparoscopic colotomy and excision of obstructing fecalith.       Plan:  - Multimodal pain control as needed  - Diet: Clear liquids  - IVF: Lactated Ringers at 100cc/hour    - DVT ppx: Subcutaneous Heparin  - OOB and ambulating as tolerated  - Awaiting return of bowel function   - F/u AM labs.      [facility]  [id number]    65F on daily calcium 1200 coming into the ED presenting with 3 weeks abdominal pain, constipation, and distension. Concern for stercoral colitis vs. mass in rectum. Most likely large stool burden from constipation. Patient now s/p flexible sigmoidoscopy for calcified stool mass 26cm from anal verge. Unable to endoscopically remove the mass (10/15). Patient is now s/p laparoscopic colotomy and excision of obstructing fecalith 10/17.       PLAN  - multimodal pain control PRN (+tramadol today)  - CLD, if tolerating will advance to LRD + miralax TID  - DVT ppx w/SQH  - OOBAT  - AROBF  - f/u AM labs      [facility]  [phone]

## 2021-10-18 NOTE — PROGRESS NOTE ADULT - SUBJECTIVE AND OBJECTIVE BOX
24h Events:  No acute events overnight.    Subjective:   Patient seen at bedside this AM. Denies n/v, SOB, CP, fevers/chills.    Objective:  Vital Signs  T(C): 37.2 (10-18 @ 01:21), Max: 98 (10-17 @ 19:31)  HR: 87 (10-18 @ 01:21) (64 - 87)  BP: 125/73 (10-18 @ 01:21) (110/56 - 139/89)  RR: 18 (10-18 @ 01:21) (16 - 18)  SpO2: 94% (10-18 @ 01:21) (94% - 100%)  10-16-21 @ 07:01  -  10-17-21 @ 07:00  --------------------------------------------------------  IN:  Total IN: 0 mL    OUT:    Voided (mL): 1600 mL  Total OUT: 1600 mL    Total NET: -1600 mL      10-17-21 @ 07:01  -  10-18-21 @ 02:20  --------------------------------------------------------  IN:  Total IN: 0 mL    OUT:    Indwelling Catheter - Urethral (mL): 600 mL  Total OUT: 600 mL    Total NET: -600 mL      Physical Exam:    General: NAD, resting comfortably in bed  Pulmonary: Nonlabored breathing, no respiratory distress  Abdominal: soft, mild tenderness to palpation, mild distension. Infraumbilical dressing has scant dried blood on the inferior half of the dressing. Port site dressings are c/d/i  Extremities: [facility]        Labs:                        11.1   7.31  )-----------( 410      ( 17 Oct 2021 07:28 )             36.4   10-17    137  |  102  |  5<L>  ----------------------------<  126<H>  3.5   |  21<L>  |  0.58    Ca    9.0      17 Oct 2021 07:25  Phos  2.4     10-17  Mg     2.0     10-17      CAPILLARY BLOOD GLUCOSE          Imaging:     24h Events:   Patient is now post-op from a laparoscopic colotomy and excision of obstructing fecalith.     Subjective:   Patient seen at bedside this AM. Denies n/v, SOB, CP, fevers/chills.    Objective:  Vital Signs  T(C): 37.2 (10-18 @ 01:21), Max: 98 (10-17 @ 19:31)  HR: 87 (10-18 @ 01:21) (64 - 87)  BP: 125/73 (10-18 @ 01:21) (110/56 - 139/89)  RR: 18 (10-18 @ 01:21) (16 - 18)  SpO2: 94% (10-18 @ 01:21) (94% - 100%)  10-16-21 @ 07:01  -  10-17-21 @ 07:00  --------------------------------------------------------  IN:  Total IN: 0 mL    OUT:    Voided (mL): 1600 mL  Total OUT: 1600 mL    Total NET: -1600 mL      10-17-21 @ 07:01  -  10-18-21 @ 02:20  --------------------------------------------------------  IN:  Total IN: 0 mL    OUT:    Indwelling Catheter - Urethral (mL): 600 mL  Total OUT: 600 mL    Total NET: -600 mL      Physical Exam:    General: NAD, resting comfortably in bed  Pulmonary: Nonlabored breathing, no respiratory distress  Abdominal: soft, mild tenderness to palpation, mild distension. Infraumbilical dressing has scant dried blood on the inferior half of the dressing. Port site dressings are c/d/i  Extremities: [facility]        Labs:                        11.1   7.31  )-----------( 410      ( 17 Oct 2021 07:28 )             36.4   10-17    137  |  102  |  5<L>  ----------------------------<  126<H>  3.5   |  21<L>  |  0.58    Ca    9.0      17 Oct 2021 07:25  Phos  2.4     10-17  Mg     2.0     10-17      CAPILLARY BLOOD GLUCOSE          Imaging:     [facility] SURGERY PROGRESS NOTE    POD #1  ·	Mobilization of splenic flexure  ·	Colotomy  ·	Removal of fecal impaction      SUBJECTIVE  Pt seen and examined at bedside. No complaints.  Pain controlled. Denies N/V. Has not yet tried eating CLD. Passing flatus.  No acute events overnight.       OBJECTIVE:    PHYSICAL EXAM   General: NAD, resting comfortably in bed  Pulmonary: Nonlabored breathing, no respiratory distress  Abdominal: soft, mild tenderness to palpation, mild distension. Infraumbilical dressing has scant dried blood on the inferior half of the dressing. Port site dressings are c/d/i  Extremities: [facility]      Vital Signs Last 24 Hrs  T(C): 36.9 (18 Oct 2021 05:15), Max: 98 (17 Oct 2021 19:31)  T(F): 98.4 (18 Oct 2021 05:15), Max: 208.4 (17 Oct 2021 19:31)  HR: 79 (18 Oct 2021 05:15) (64 - 87)  BP: 158/73 (18 Oct 2021 05:15) (110/56 - 158/73)  BP(mean): 79 (17 Oct 2021 18:30) (78 - 85)  RR: 18 (18 Oct 2021 05:15) (16 - 18)  SpO2: 94% (18 Oct 2021 05:15) (94% - 100%)    I's & O's    17 Oct 2021 07:01  -  18 Oct 2021 07:00  --------------------------------------------------------  IN:    IV PiggyBack: 600 mL    IV PiggyBack: 100 mL    Lactated Ringers: 1100 mL  Total IN: 1800 mL    OUT:    Indwelling Catheter - Urethral (mL): 850 mL  Total OUT: 850 mL    Total NET: 950 mL            MEDICATIONS:  ·	DVT PROPHYLAXIS: heparin   Injectable 5000 Unit(s)      LAB/STUDIES:                        9.9    11.56 )-----------( 330      ( 18 Oct 2021 07:29 )             32.4     137  |  102  |  5<L>  ----------------------------<  126<H>  3.5   |  21<L>  |  0.58    Ca    9.0      17 Oct 2021 07:25  Phos  2.4     10-17  Mg     2.0     10-17      PT/INR - ( 17 Oct 2021 07:32 )   PT: 15.2 sec;   INR: 1.28 ratio    PTT - ( 17 Oct 2021 07:32 )  PTT:28.3 sec

## 2021-10-18 NOTE — PROGRESS NOTE ADULT - SUBJECTIVE AND OBJECTIVE BOX
Chief Complaint:  Patient is a 65y old  Female who presents with a chief complaint of Stercoral Colitis (18 Oct 2021 02:20)      Interval Events: S/p OR. No overnight events. No fever or chills.    Allergies:  No Known Allergies      Hospital Medications:  acetaminophen  IVPB .. 1000 milliGRAM(s) IV Intermittent every 6 hours  heparin   Injectable 5000 Unit(s) SubCutaneous every 8 hours  HYDROmorphone  Injectable 0.25 milliGRAM(s) IV Push every 6 hours PRN  ibuprofen  Tablet. 400 milliGRAM(s) Oral every 6 hours  influenza   Vaccine 0.5 milliLiter(s) IntraMuscular once  ketorolac   Injectable 15 milliGRAM(s) IV Push every 6 hours  lactated ringers. 1000 milliLiter(s) IV Continuous <Continuous>  traMADol 25 milliGRAM(s) Oral every 6 hours PRN  traMADol 50 milliGRAM(s) Oral every 6 hours PRN      PMHX/PSHX:  No pertinent past medical history    H/O total knee replacement, left        Family history:      ROS: As per HPI, 14-point ROS negative otherwise.    General:  No wt loss, fevers, chills, night sweats, fatigue,   Eyes:  Good vision, no reported pain  ENT:  No sore throat, pain, runny nose, dysphagia  CV:  No pain, palpitations, hypo/hypertension  Resp:  No dyspnea, cough, tachypnea, wheezing  GI:  See HPI  :  No pain, bleeding, incontinence, nocturia  Muscle:  No pain, weakness  Neuro:  No weakness, tingling, memory problems  Psych:  No fatigue, insomnia, mood problems, depression  Endocrine:  No polyuria, polydipsia, cold/heat intolerance  Heme:  No petechiae, ecchymosis, easy bruisability  Skin:  No rash, edema      PHYSICAL EXAM:     Vital Signs:  Vital Signs Last 24 Hrs  T(C): 36.9 (18 Oct 2021 05:15), Max: 98 (17 Oct 2021 19:31)  T(F): 98.4 (18 Oct 2021 05:15), Max: 208.4 (17 Oct 2021 19:31)  HR: 79 (18 Oct 2021 05:15) (64 - 87)  BP: 158/73 (18 Oct 2021 05:15) (110/56 - 158/73)  BP(mean): 79 (17 Oct 2021 18:30) (78 - 85)  RR: 18 (18 Oct 2021 05:15) (16 - 18)  SpO2: 94% (18 Oct 2021 05:15) (94% - 100%)  Daily Height in cm: 162.56 (17 Oct 2021 13:11)    Daily     GENERAL:  appears comfortable, no acute distress  HEENT:  NC/AT,  conjunctivae clear, sclera -anicteric  CHEST:  no increased effort  HEART:  Regular rate and rhythm  ABDOMEN:  Soft, non-tender, non-distended,  colostomy  EXTREMITIES:  no cyanosis, clubbing or edema  SKIN:  No rash/erythema/ecchymoses/petechiae/wounds  NEURO:  Alert, oriented    LABS:                        9.9    11.56 )-----------( 330      ( 18 Oct 2021 07:29 )             32.4     10-18    137  |  103  |  6<L>  ----------------------------<  90  4.5   |  21<L>  |  0.60    Ca    8.2<L>      18 Oct 2021 07:26  Phos  4.4     10-18  Mg     2.0     10-18        PT/INR - ( 17 Oct 2021 07:32 )   PT: 15.2 sec;   INR: 1.28 ratio         PTT - ( 17 Oct 2021 07:32 )  PTT:28.3 sec        Imaging:

## 2021-10-18 NOTE — PROGRESS NOTE ADULT - SUBJECTIVE AND OBJECTIVE BOX
Day 1 of Anesthesia Pain Management Service    SUBJECTIVE: Starting to feel uncomfortable  Pain Scale Score:          [X] Refer to charted pain scores    THERAPY:    s/p bilateral tap blocks    MEDICATIONS  (STANDING):  acetaminophen  IVPB .. 1000 milliGRAM(s) IV Intermittent every 6 hours  dextrose 5% + sodium chloride 0.45% with potassium chloride 20 mEq/L 1000 milliLiter(s) (75 mL/Hr) IV Continuous <Continuous>  heparin   Injectable 5000 Unit(s) SubCutaneous every 8 hours  ibuprofen  Tablet. 400 milliGRAM(s) Oral every 6 hours  influenza   Vaccine 0.5 milliLiter(s) IntraMuscular once  ketorolac   Injectable 15 milliGRAM(s) IV Push every 6 hours    MEDICATIONS  (PRN):  HYDROmorphone  Injectable 0.25 milliGRAM(s) IV Push every 6 hours PRN Severe Pain (7 - 10)  traMADol 25 milliGRAM(s) Oral every 6 hours PRN Moderate Pain (4 - 6)  traMADol 50 milliGRAM(s) Oral every 6 hours PRN Severe Pain (7 - 10)      OBJECTIVE:    Sedation:        	[X] Alert	 [ ] Drowsy	[ ] Arousable      [ ] Asleep       [ ] Unresponsive    Side Effects:	[X] None 	[ ] Nausea	[ ] Vomiting         [ ] Pruritus  		[ ] Weakness            [ ] Numbness	          [ ] Other:    Vital Signs Last 24 Hrs  T(C): 36.9 (18 Oct 2021 05:15), Max: 98 (17 Oct 2021 19:31)  T(F): 98.4 (18 Oct 2021 05:15), Max: 208.4 (17 Oct 2021 19:31)  HR: 79 (18 Oct 2021 05:15) (64 - 87)  BP: 158/73 (18 Oct 2021 05:15) (110/56 - 158/73)  BP(mean): 79 (17 Oct 2021 18:30) (78 - 85)  RR: 18 (18 Oct 2021 05:15) (16 - 18)  SpO2: 94% (18 Oct 2021 05:15) (94% - 100%)    ASSESSMENT/ PLAN  [X] Patient transitioned to prn analgesics  [X] Pain management per primary service, pain service to sign off   [X]Documentation and Verification of current medications

## 2021-10-19 LAB
ANION GAP SERPL CALC-SCNC: 11 MMOL/L — SIGNIFICANT CHANGE UP (ref 5–17)
BUN SERPL-MCNC: 9 MG/DL — SIGNIFICANT CHANGE UP (ref 7–23)
CALCIUM SERPL-MCNC: 8 MG/DL — LOW (ref 8.4–10.5)
CHLORIDE SERPL-SCNC: 101 MMOL/L — SIGNIFICANT CHANGE UP (ref 96–108)
CO2 SERPL-SCNC: 24 MMOL/L — SIGNIFICANT CHANGE UP (ref 22–31)
CREAT SERPL-MCNC: 0.61 MG/DL — SIGNIFICANT CHANGE UP (ref 0.5–1.3)
GLUCOSE SERPL-MCNC: 96 MG/DL — SIGNIFICANT CHANGE UP (ref 70–99)
HCT VFR BLD CALC: 32.9 % — LOW (ref 34.5–45)
HGB BLD-MCNC: 10.2 G/DL — LOW (ref 11.5–15.5)
MAGNESIUM SERPL-MCNC: 1.8 MG/DL — SIGNIFICANT CHANGE UP (ref 1.6–2.6)
MCHC RBC-ENTMCNC: 25.9 PG — LOW (ref 27–34)
MCHC RBC-ENTMCNC: 31 GM/DL — LOW (ref 32–36)
MCV RBC AUTO: 83.5 FL — SIGNIFICANT CHANGE UP (ref 80–100)
NRBC # BLD: 0 /100 WBCS — SIGNIFICANT CHANGE UP (ref 0–0)
PHOSPHATE SERPL-MCNC: 2.3 MG/DL — LOW (ref 2.5–4.5)
PLATELET # BLD AUTO: 329 K/UL — SIGNIFICANT CHANGE UP (ref 150–400)
POTASSIUM SERPL-MCNC: 4.2 MMOL/L — SIGNIFICANT CHANGE UP (ref 3.5–5.3)
POTASSIUM SERPL-SCNC: 4.2 MMOL/L — SIGNIFICANT CHANGE UP (ref 3.5–5.3)
RBC # BLD: 3.94 M/UL — SIGNIFICANT CHANGE UP (ref 3.8–5.2)
RBC # FLD: 14.4 % — SIGNIFICANT CHANGE UP (ref 10.3–14.5)
SODIUM SERPL-SCNC: 136 MMOL/L — SIGNIFICANT CHANGE UP (ref 135–145)
WBC # BLD: 12.01 K/UL — HIGH (ref 3.8–10.5)
WBC # FLD AUTO: 12.01 K/UL — HIGH (ref 3.8–10.5)

## 2021-10-19 RX ORDER — SODIUM,POTASSIUM PHOSPHATES 278-250MG
2 POWDER IN PACKET (EA) ORAL ONCE
Refills: 0 | Status: COMPLETED | OUTPATIENT
Start: 2021-10-19 | End: 2021-10-19

## 2021-10-19 RX ORDER — POLYETHYLENE GLYCOL 3350 17 G/17G
17 POWDER, FOR SOLUTION ORAL
Refills: 0 | Status: DISCONTINUED | OUTPATIENT
Start: 2021-10-19 | End: 2021-10-21

## 2021-10-19 RX ORDER — IBUPROFEN 200 MG
600 TABLET ORAL EVERY 6 HOURS
Refills: 0 | Status: DISCONTINUED | OUTPATIENT
Start: 2021-10-19 | End: 2021-10-20

## 2021-10-19 RX ORDER — ONDANSETRON 8 MG/1
4 TABLET, FILM COATED ORAL ONCE
Refills: 0 | Status: COMPLETED | OUTPATIENT
Start: 2021-10-19 | End: 2021-10-19

## 2021-10-19 RX ORDER — ACETAMINOPHEN 500 MG
1000 TABLET ORAL EVERY 6 HOURS
Refills: 0 | Status: DISCONTINUED | OUTPATIENT
Start: 2021-10-19 | End: 2021-10-23

## 2021-10-19 RX ADMIN — Medication 600 MILLIGRAM(S): at 07:50

## 2021-10-19 RX ADMIN — Medication 100 MILLIGRAM(S): at 21:30

## 2021-10-19 RX ADMIN — TRAMADOL HYDROCHLORIDE 50 MILLIGRAM(S): 50 TABLET ORAL at 06:34

## 2021-10-19 RX ADMIN — TRAMADOL HYDROCHLORIDE 50 MILLIGRAM(S): 50 TABLET ORAL at 12:22

## 2021-10-19 RX ADMIN — Medication 1000 MILLIGRAM(S): at 17:54

## 2021-10-19 RX ADMIN — HEPARIN SODIUM 5000 UNIT(S): 5000 INJECTION INTRAVENOUS; SUBCUTANEOUS at 15:58

## 2021-10-19 RX ADMIN — ONDANSETRON 4 MILLIGRAM(S): 8 TABLET, FILM COATED ORAL at 14:43

## 2021-10-19 RX ADMIN — SODIUM CHLORIDE 3 MILLILITER(S): 9 INJECTION INTRAMUSCULAR; INTRAVENOUS; SUBCUTANEOUS at 05:25

## 2021-10-19 RX ADMIN — Medication 2 PACKET(S): at 11:39

## 2021-10-19 RX ADMIN — Medication 200 MILLIGRAM(S): at 05:13

## 2021-10-19 RX ADMIN — ONDANSETRON 4 MILLIGRAM(S): 8 TABLET, FILM COATED ORAL at 06:43

## 2021-10-19 RX ADMIN — POLYETHYLENE GLYCOL 3350 17 GRAM(S): 17 POWDER, FOR SOLUTION ORAL at 20:41

## 2021-10-19 RX ADMIN — SODIUM CHLORIDE 3 MILLILITER(S): 9 INJECTION INTRAMUSCULAR; INTRAVENOUS; SUBCUTANEOUS at 15:09

## 2021-10-19 RX ADMIN — Medication 200 MILLIGRAM(S): at 17:32

## 2021-10-19 RX ADMIN — Medication 1000 MILLIGRAM(S): at 17:32

## 2021-10-19 RX ADMIN — HEPARIN SODIUM 5000 UNIT(S): 5000 INJECTION INTRAVENOUS; SUBCUTANEOUS at 21:30

## 2021-10-19 RX ADMIN — Medication 600 MILLIGRAM(S): at 08:30

## 2021-10-19 RX ADMIN — HEPARIN SODIUM 5000 UNIT(S): 5000 INJECTION INTRAVENOUS; SUBCUTANEOUS at 05:13

## 2021-10-19 RX ADMIN — Medication 1000 MILLIGRAM(S): at 12:05

## 2021-10-19 RX ADMIN — Medication 100 MILLIGRAM(S): at 05:13

## 2021-10-19 RX ADMIN — Medication 600 MILLIGRAM(S): at 21:52

## 2021-10-19 RX ADMIN — Medication 650 MILLIGRAM(S): at 06:32

## 2021-10-19 RX ADMIN — Medication 650 MILLIGRAM(S): at 05:14

## 2021-10-19 RX ADMIN — Medication 1000 MILLIGRAM(S): at 11:39

## 2021-10-19 RX ADMIN — TRAMADOL HYDROCHLORIDE 50 MILLIGRAM(S): 50 TABLET ORAL at 06:04

## 2021-10-19 RX ADMIN — Medication 100 MILLIGRAM(S): at 14:42

## 2021-10-19 RX ADMIN — Medication 600 MILLIGRAM(S): at 22:22

## 2021-10-19 RX ADMIN — TRAMADOL HYDROCHLORIDE 50 MILLIGRAM(S): 50 TABLET ORAL at 13:00

## 2021-10-19 RX ADMIN — POLYETHYLENE GLYCOL 3350 17 GRAM(S): 17 POWDER, FOR SOLUTION ORAL at 17:31

## 2021-10-19 RX ADMIN — SODIUM CHLORIDE 3 MILLILITER(S): 9 INJECTION INTRAMUSCULAR; INTRAVENOUS; SUBCUTANEOUS at 21:21

## 2021-10-19 RX ADMIN — TRAMADOL HYDROCHLORIDE 50 MILLIGRAM(S): 50 TABLET ORAL at 00:29

## 2021-10-19 NOTE — PROGRESS NOTE ADULT - SUBJECTIVE AND OBJECTIVE BOX
24h Events:  No acute events overnight.    Subjective:   Patient seen at bedside this AM. Denies n/v, SOB, CP, fevers/chills.    Objective:  Vital Signs  T(C): 36.8 (10-19 @ 00:50), Max: 37.1 (10-18 @ 10:50)  HR: 85 (10-19 @ 00:50) (77 - 85)  BP: 125/72 (10-19 @ 00:50) (125/72 - 158/73)  RR: 18 (10-19 @ 00:50) (18 - 18)  SpO2: 94% (10-19 @ 00:50) (94% - 96%)  10-17-21 @ 07:01  -  10-18-21 @ 07:00  --------------------------------------------------------  IN:  Total IN: 0 mL    OUT:    Indwelling Catheter - Urethral (mL): 850 mL  Total OUT: 850 mL    Total NET: -850 mL      10-18-21 @ 07:01  -  10-19-21 @ 03:02  --------------------------------------------------------  IN:  Total IN: 0 mL    OUT:    Indwelling Catheter - Urethral (mL): 300 mL    Voided (mL): 275 mL  Total OUT: 575 mL    Total NET: -575 mL          Physical Exam:  GEN:   RESP:   ABD:   EXTR:   NEURO:     Labs:                        9.9    11.56 )-----------( 330      ( 18 Oct 2021 07:29 )             32.4   10-18    137  |  103  |  6<L>  ----------------------------<  90  4.5   |  21<L>  |  0.60    Ca    8.2<L>      18 Oct 2021 07:26  Phos  4.4     10-18  Mg     2.0     10-18      CAPILLARY BLOOD GLUCOSE          Imaging:     24h Events:  No acute events overnight.    Subjective:   Patient seen at bedside this AM. Denies n/v, fevers/chills. Tolerating LRD. Passing flatus and had 3 bowel movements over past 24 hrs.     Objective:  Vital Signs  T(C): 36.8 (10-19 @ 00:50), Max: 37.1 (10-18 @ 10:50)  HR: 85 (10-19 @ 00:50) (77 - 85)  BP: 125/72 (10-19 @ 00:50) (125/72 - 158/73)  RR: 18 (10-19 @ 00:50) (18 - 18)  SpO2: 94% (10-19 @ 00:50) (94% - 96%)  10-17-21 @ 07:01  -  10-18-21 @ 07:00  --------------------------------------------------------  IN:  Total IN: 0 mL    OUT:    Indwelling Catheter - Urethral (mL): 850 mL  Total OUT: 850 mL    Total NET: -850 mL      10-18-21 @ 07:01  -  10-19-21 @ 03:02  --------------------------------------------------------  IN:  Total IN: 0 mL    OUT:    Indwelling Catheter - Urethral (mL): 300 mL    Voided (mL): 275 mL  Total OUT: 575 mL    Total NET: -575 mL          Physical Exam:  General: NAD, resting comfortably in bed  Pulmonary: Nonlabored breathing, no respiratory distress  Abdominal: soft, nontender, nondistended. Infraumbilical dressing has scant dried blood on the inferior half of the dressing. Port site dressings are c/d/i  Extremities: [facility]    Labs:                        9.9    11.56 )-----------( 330      ( 18 Oct 2021 07:29 )             32.4   10-18    137  |  103  |  6<L>  ----------------------------<  90  4.5   |  21<L>  |  0.60    Ca    8.2<L>      18 Oct 2021 07:26  Phos  4.4     10-18  Mg     2.0     10-18      CAPILLARY BLOOD GLUCOSE          Imaging:

## 2021-10-19 NOTE — PROGRESS NOTE ADULT - ASSESSMENT
65F on daily calcium 1200 coming into the ED presenting with 3 weeks abdominal pain, constipation, and distension. Concern for stercoral colitis vs. mass in rectum. Most likely large stool burden from constipation. Patient now s/p flexible sigmoidoscopy for calcified stool mass 26cm from anal verge. Unable to endoscopically remove the mass (10/15). Patient is now s/p laparoscopic colotomy and excision of obstructing fecalith 10/17.       PLAN  - multimodal pain control PRN (+tramadol today)  -  LRD + miralax TID  - DVT ppx w/SQH  - OOBAT  - f/u AM labs      [facility]  [phone]

## 2021-10-20 ENCOUNTER — TRANSCRIPTION ENCOUNTER (OUTPATIENT)
Age: 66
End: 2021-10-20

## 2021-10-20 LAB
ANION GAP SERPL CALC-SCNC: 11 MMOL/L — SIGNIFICANT CHANGE UP (ref 5–17)
BUN SERPL-MCNC: 8 MG/DL — SIGNIFICANT CHANGE UP (ref 7–23)
CALCIUM SERPL-MCNC: 8.4 MG/DL — SIGNIFICANT CHANGE UP (ref 8.4–10.5)
CHLORIDE SERPL-SCNC: 101 MMOL/L — SIGNIFICANT CHANGE UP (ref 96–108)
CO2 SERPL-SCNC: 24 MMOL/L — SIGNIFICANT CHANGE UP (ref 22–31)
CREAT SERPL-MCNC: 0.61 MG/DL — SIGNIFICANT CHANGE UP (ref 0.5–1.3)
GLUCOSE SERPL-MCNC: 141 MG/DL — HIGH (ref 70–99)
HCT VFR BLD CALC: 33.7 % — LOW (ref 34.5–45)
HGB BLD-MCNC: 10.5 G/DL — LOW (ref 11.5–15.5)
MAGNESIUM SERPL-MCNC: 1.8 MG/DL — SIGNIFICANT CHANGE UP (ref 1.6–2.6)
MCHC RBC-ENTMCNC: 25.9 PG — LOW (ref 27–34)
MCHC RBC-ENTMCNC: 31.2 GM/DL — LOW (ref 32–36)
MCV RBC AUTO: 83.2 FL — SIGNIFICANT CHANGE UP (ref 80–100)
NRBC # BLD: 0 /100 WBCS — SIGNIFICANT CHANGE UP (ref 0–0)
PHOSPHATE SERPL-MCNC: 2.7 MG/DL — SIGNIFICANT CHANGE UP (ref 2.5–4.5)
PLATELET # BLD AUTO: 315 K/UL — SIGNIFICANT CHANGE UP (ref 150–400)
POTASSIUM SERPL-MCNC: 3.6 MMOL/L — SIGNIFICANT CHANGE UP (ref 3.5–5.3)
POTASSIUM SERPL-SCNC: 3.6 MMOL/L — SIGNIFICANT CHANGE UP (ref 3.5–5.3)
RBC # BLD: 4.05 M/UL — SIGNIFICANT CHANGE UP (ref 3.8–5.2)
RBC # FLD: 14.4 % — SIGNIFICANT CHANGE UP (ref 10.3–14.5)
SARS-COV-2 RNA SPEC QL NAA+PROBE: SIGNIFICANT CHANGE UP
SODIUM SERPL-SCNC: 136 MMOL/L — SIGNIFICANT CHANGE UP (ref 135–145)
WBC # BLD: 11.47 K/UL — HIGH (ref 3.8–10.5)
WBC # FLD AUTO: 11.47 K/UL — HIGH (ref 3.8–10.5)

## 2021-10-20 PROCEDURE — 74018 RADEX ABDOMEN 1 VIEW: CPT | Mod: 26

## 2021-10-20 RX ORDER — CHLORHEXIDINE GLUCONATE 213 G/1000ML
1 SOLUTION TOPICAL DAILY
Refills: 0 | Status: DISCONTINUED | OUTPATIENT
Start: 2021-10-20 | End: 2021-10-23

## 2021-10-20 RX ORDER — POLYETHYLENE GLYCOL 3350 17 G/17G
17 POWDER, FOR SOLUTION ORAL ONCE
Refills: 0 | Status: COMPLETED | OUTPATIENT
Start: 2021-10-20 | End: 2021-10-20

## 2021-10-20 RX ORDER — MAGNESIUM SULFATE 500 MG/ML
2 VIAL (ML) INJECTION ONCE
Refills: 0 | Status: COMPLETED | OUTPATIENT
Start: 2021-10-20 | End: 2021-10-20

## 2021-10-20 RX ORDER — CHLORHEXIDINE GLUCONATE 213 G/1000ML
1 SOLUTION TOPICAL DAILY
Refills: 0 | Status: DISCONTINUED | OUTPATIENT
Start: 2021-10-20 | End: 2021-10-20

## 2021-10-20 RX ORDER — POTASSIUM PHOSPHATE, MONOBASIC POTASSIUM PHOSPHATE, DIBASIC 236; 224 MG/ML; MG/ML
30 INJECTION, SOLUTION INTRAVENOUS ONCE
Refills: 0 | Status: COMPLETED | OUTPATIENT
Start: 2021-10-20 | End: 2021-10-20

## 2021-10-20 RX ADMIN — POTASSIUM PHOSPHATE, MONOBASIC POTASSIUM PHOSPHATE, DIBASIC 83.33 MILLIMOLE(S): 236; 224 INJECTION, SOLUTION INTRAVENOUS at 08:20

## 2021-10-20 RX ADMIN — HEPARIN SODIUM 5000 UNIT(S): 5000 INJECTION INTRAVENOUS; SUBCUTANEOUS at 14:17

## 2021-10-20 RX ADMIN — SODIUM CHLORIDE 3 MILLILITER(S): 9 INJECTION INTRAMUSCULAR; INTRAVENOUS; SUBCUTANEOUS at 16:59

## 2021-10-20 RX ADMIN — Medication 600 MILLIGRAM(S): at 06:30

## 2021-10-20 RX ADMIN — TRAMADOL HYDROCHLORIDE 50 MILLIGRAM(S): 50 TABLET ORAL at 15:16

## 2021-10-20 RX ADMIN — SODIUM CHLORIDE 3 MILLILITER(S): 9 INJECTION INTRAMUSCULAR; INTRAVENOUS; SUBCUTANEOUS at 22:20

## 2021-10-20 RX ADMIN — TRAMADOL HYDROCHLORIDE 50 MILLIGRAM(S): 50 TABLET ORAL at 14:16

## 2021-10-20 RX ADMIN — HEPARIN SODIUM 5000 UNIT(S): 5000 INJECTION INTRAVENOUS; SUBCUTANEOUS at 05:54

## 2021-10-20 RX ADMIN — Medication 100 MILLIGRAM(S): at 22:11

## 2021-10-20 RX ADMIN — Medication 200 MILLIGRAM(S): at 18:49

## 2021-10-20 RX ADMIN — Medication 100 MILLIGRAM(S): at 05:53

## 2021-10-20 RX ADMIN — Medication 1000 MILLIGRAM(S): at 22:12

## 2021-10-20 RX ADMIN — Medication 50 GRAM(S): at 08:20

## 2021-10-20 RX ADMIN — CHLORHEXIDINE GLUCONATE 1 APPLICATION(S): 213 SOLUTION TOPICAL at 14:25

## 2021-10-20 RX ADMIN — HEPARIN SODIUM 5000 UNIT(S): 5000 INJECTION INTRAVENOUS; SUBCUTANEOUS at 22:12

## 2021-10-20 RX ADMIN — Medication 200 MILLIGRAM(S): at 05:53

## 2021-10-20 RX ADMIN — POLYETHYLENE GLYCOL 3350 17 GRAM(S): 17 POWDER, FOR SOLUTION ORAL at 14:16

## 2021-10-20 RX ADMIN — POLYETHYLENE GLYCOL 3350 17 GRAM(S): 17 POWDER, FOR SOLUTION ORAL at 20:45

## 2021-10-20 RX ADMIN — Medication 1000 MILLIGRAM(S): at 04:29

## 2021-10-20 RX ADMIN — Medication 600 MILLIGRAM(S): at 06:00

## 2021-10-20 RX ADMIN — SODIUM CHLORIDE 3 MILLILITER(S): 9 INJECTION INTRAMUSCULAR; INTRAVENOUS; SUBCUTANEOUS at 05:25

## 2021-10-20 RX ADMIN — Medication 100 MILLIGRAM(S): at 14:16

## 2021-10-20 RX ADMIN — Medication 1000 MILLIGRAM(S): at 04:59

## 2021-10-20 NOTE — DISCHARGE NOTE PROVIDER - NSDCCPTREATMENT_GEN_ALL_CORE_FT
PRINCIPAL PROCEDURE  Procedure: Colotomy  Findings and Treatment:       SECONDARY PROCEDURE  Procedure: Mobilization of splenic flexure  Findings and Treatment:

## 2021-10-20 NOTE — DISCHARGE NOTE PROVIDER - NSDCMRMEDTOKEN_GEN_ALL_CORE_FT
acetaminophen 500 mg oral tablet: 2 tab(s) orally every 6 hours  lactulose 10 g/15 mL oral syrup: 30 milliliter(s) orally 4 times a day  Ultram 50 mg oral tablet: 0.5 - 1 tab(s) orally every 6 hours, As needed, Moderate Pain (4 - 6) MDD:4   acetaminophen 500 mg oral tablet: 2 tab(s) orally every 6 hours  ciprofloxacin 500 mg oral tablet: 1 tab(s) orally every 12 hours  lactulose 10 g/15 mL oral syrup: 30 milliliter(s) orally 4 times a day  lactulose 10 g/15 mL oral syrup: 30 milliliter(s) orally 4 times a day  metroNIDAZOLE 500 mg oral tablet: 1 tab(s) orally every 12 hours  Ultram 50 mg oral tablet: 0.5 - 1 tab(s) orally every 6 hours, As needed, Moderate Pain (4 - 6) MDD:4

## 2021-10-20 NOTE — PROGRESS NOTE ADULT - SUBJECTIVE AND OBJECTIVE BOX
24h Events:  No acute events overnight.    Subjective:   Patient seen at bedside this AM. Denies n/v, SOB, CP, fevers/chills.    Objective:  Vital Signs  T(C): 36.6 (10-20 @ 00:50), Max: 37.1 (10-19 @ 09:54)  HR: 75 (10-20 @ 00:50) (73 - 82)  BP: 116/67 (10-20 @ 00:50) (116/67 - 146/83)  RR: 18 (10-20 @ 00:50) (18 - 18)  SpO2: 95% (10-20 @ 00:50) (94% - 96%)  10-18-21 @ 07:01  -  10-19-21 @ 07:00  --------------------------------------------------------  IN:  Total IN: 0 mL    OUT:    Indwelling Catheter - Urethral (mL): 300 mL    Voided (mL): 275 mL  Total OUT: 575 mL    Total NET: -575 mL      10-19-21 @ 07:01  -  10-20-21 @ 03:06  --------------------------------------------------------  IN:  Total IN: 0 mL    OUT:    Voided (mL): 700 mL  Total OUT: 700 mL    Total NET: -700 mL          Physical Exam:  GEN:   RESP:   ABD:   EXTR:   NEURO:     Labs:                        10.2   12.01 )-----------( 329      ( 19 Oct 2021 08:39 )             32.9   10-19    136  |  101  |  9   ----------------------------<  96  4.2   |  24  |  0.61    Ca    8.0<L>      19 Oct 2021 08:39  Phos  2.3     10-19  Mg     1.8     10-19      CAPILLARY BLOOD GLUCOSE          Imaging:     24h Events:  No acute events overnight.    Subjective:   Patient seen at bedside this AM. Denies n/v, SOB, CP, fevers/chills.  Tolerating diet with flatus    Objective:  Vital Signs  T(C): 36.6 (10-20 @ 00:50), Max: 37.1 (10-19 @ 09:54)  HR: 75 (10-20 @ 00:50) (73 - 82)  BP: 116/67 (10-20 @ 00:50) (116/67 - 146/83)  RR: 18 (10-20 @ 00:50) (18 - 18)  SpO2: 95% (10-20 @ 00:50) (94% - 96%)  10-18-21 @ 07:01  -  10-19-21 @ 07:00  --------------------------------------------------------  IN:  Total IN: 0 mL    OUT:    Indwelling Catheter - Urethral (mL): 300 mL    Voided (mL): 275 mL  Total OUT: 575 mL    Total NET: -575 mL      10-19-21 @ 07:01  -  10-20-21 @ 03:06  --------------------------------------------------------  IN:  Total IN: 0 mL    OUT:    Voided (mL): 700 mL  Total OUT: 700 mL    Total NET: -700 mL          Physical Exam:  GEN:  no distress  RESP:   clear  ABD:  soft, minimal distension  EXTR:  no swelling  NEURO:   wnl    Labs:                        10.2   12.01 )-----------( 329      ( 19 Oct 2021 08:39 )             32.9   10-19    136  |  101  |  9   ----------------------------<  96  4.2   |  24  |  0.61    Ca    8.0<L>      19 Oct 2021 08:39  Phos  2.3     10-19  Mg     1.8     10-19      CAPILLARY BLOOD GLUCOSE          Imaging:

## 2021-10-20 NOTE — PROVIDER CONTACT NOTE (OTHER) - SITUATION
Pt had abdominal surgery recently d/t bowel obstruction, pt needs to have BM in order to be d/c home, pt is very anxious because she didn't have any BM yet and its been a few days since surgery
pt c/o nausea, no medications for nausea ordered
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
## 2021-10-20 NOTE — PROVIDER CONTACT NOTE (OTHER) - ASSESSMENT
Pt has been taking scheduled miralax but she hasn't gone yet and would like something for gas and additional BM regimen to help with passing stool

## 2021-10-20 NOTE — DISCHARGE NOTE PROVIDER - CARE PROVIDER_API CALL
Lee Ngo)  ColonRectal Surgery; Surgery  [address], [address]  [place] [address]  Phone: [phone]  Fax: [phone]  Follow Up Time: 1 week

## 2021-10-20 NOTE — PROGRESS NOTE ADULT - ASSESSMENT
Feeling better, less pain    observe on diet and miralax  if tolerates and has further bowel function then dc home to complete po abx 65F on daily calcium 1200 coming into the ED presenting with 3 weeks abdominal pain, constipation, and distension. Concern for stercoral colitis vs. mass in rectum. Most likely large stool burden from constipation. Patient now s/p flexible sigmoidoscopy for calcified stool mass 26cm from anal verge. Unable to endoscopically remove the mass (10/15). Patient is now s/p laparoscopic colotomy and excision of obstructing fecalith 10/17.       PLAN  - multimodal pain control PRN (+tramadol today)  - LRD + miralax TID  - DVT ppx w/SQH  - OOBAT  - f/u AM labs, replete prn  - can be discharged home with po abx if feels well and continue to have gi fx and tolerates a diet

## 2021-10-20 NOTE — PROVIDER CONTACT NOTE (OTHER) - BACKGROUND
Pt had abdominal surgery recently d/t bowel obstruction, pt needs to have BM in order to be d/c home, pt is very anxious because she didn't have any BM yet and its been a few days since surgery

## 2021-10-20 NOTE — DISCHARGE NOTE PROVIDER - NSDCCPCAREPLAN_GEN_ALL_CORE_FT
PRINCIPAL DISCHARGE DIAGNOSIS  Diagnosis: Fecalith  Assessment and Plan of Treatment: WOUND CARE: Keep incisions clean and dry.  Follow-up in office for removal of staples.  BATHING: Please do not submerge wound underwater. You may shower and/or sponge bathe.  ACTIVITY: No heavy lifting or straining. Otherwise, you may return to your usual level of physical activity. If you are taking narcotic pain medication (such as Percocet), do NOT drive a car, operate machinery or make important decisions.  DIET: Return to your usual diet.  NOTIFY YOUR SURGEON IF: You have any bleeding that does not stop, any pus draining from your wound, any fever (over 100.4 F) or chills, persistent nausea/vomiting, persistent diarrhea, or if your pain is not controlled on your discharge pain medications.  FOLLOW-UP:  1. Follow-up with Dr. Ngo within 1-2 weeks of discharge.  Please call office for appointment  2. Please follow up with your primary care physician in one week regarding your hospitalization.      SECONDARY DISCHARGE DIAGNOSES  Diagnosis: Constipation  Assessment and Plan of Treatment:

## 2021-10-21 LAB
ANION GAP SERPL CALC-SCNC: 11 MMOL/L — SIGNIFICANT CHANGE UP (ref 5–17)
BUN SERPL-MCNC: 9 MG/DL — SIGNIFICANT CHANGE UP (ref 7–23)
CALCIUM SERPL-MCNC: 8.1 MG/DL — LOW (ref 8.4–10.5)
CHLORIDE SERPL-SCNC: 104 MMOL/L — SIGNIFICANT CHANGE UP (ref 96–108)
CO2 SERPL-SCNC: 22 MMOL/L — SIGNIFICANT CHANGE UP (ref 22–31)
CREAT SERPL-MCNC: 0.54 MG/DL — SIGNIFICANT CHANGE UP (ref 0.5–1.3)
GLUCOSE SERPL-MCNC: 100 MG/DL — HIGH (ref 70–99)
HCT VFR BLD CALC: 32.3 % — LOW (ref 34.5–45)
HGB BLD-MCNC: 10 G/DL — LOW (ref 11.5–15.5)
MAGNESIUM SERPL-MCNC: 2.2 MG/DL — SIGNIFICANT CHANGE UP (ref 1.6–2.6)
MCHC RBC-ENTMCNC: 25.9 PG — LOW (ref 27–34)
MCHC RBC-ENTMCNC: 31 GM/DL — LOW (ref 32–36)
MCV RBC AUTO: 83.7 FL — SIGNIFICANT CHANGE UP (ref 80–100)
NRBC # BLD: 0 /100 WBCS — SIGNIFICANT CHANGE UP (ref 0–0)
PHOSPHATE SERPL-MCNC: 3.2 MG/DL — SIGNIFICANT CHANGE UP (ref 2.5–4.5)
PLATELET # BLD AUTO: 270 K/UL — SIGNIFICANT CHANGE UP (ref 150–400)
POTASSIUM SERPL-MCNC: 3.8 MMOL/L — SIGNIFICANT CHANGE UP (ref 3.5–5.3)
POTASSIUM SERPL-SCNC: 3.8 MMOL/L — SIGNIFICANT CHANGE UP (ref 3.5–5.3)
RBC # BLD: 3.86 M/UL — SIGNIFICANT CHANGE UP (ref 3.8–5.2)
RBC # FLD: 14.8 % — HIGH (ref 10.3–14.5)
SODIUM SERPL-SCNC: 137 MMOL/L — SIGNIFICANT CHANGE UP (ref 135–145)
WBC # BLD: 11.05 K/UL — HIGH (ref 3.8–10.5)
WBC # FLD AUTO: 11.05 K/UL — HIGH (ref 3.8–10.5)

## 2021-10-21 RX ORDER — LACTULOSE 10 G/15ML
20 SOLUTION ORAL ONCE
Refills: 0 | Status: COMPLETED | OUTPATIENT
Start: 2021-10-21 | End: 2021-10-21

## 2021-10-21 RX ORDER — LACTULOSE 10 G/15ML
20 SOLUTION ORAL
Refills: 0 | Status: DISCONTINUED | OUTPATIENT
Start: 2021-10-21 | End: 2021-10-23

## 2021-10-21 RX ORDER — SIMETHICONE 80 MG/1
80 TABLET, CHEWABLE ORAL ONCE
Refills: 0 | Status: COMPLETED | OUTPATIENT
Start: 2021-10-21 | End: 2021-10-21

## 2021-10-21 RX ORDER — LACTULOSE 10 G/15ML
20 SOLUTION ORAL
Refills: 0 | Status: DISCONTINUED | OUTPATIENT
Start: 2021-10-21 | End: 2021-10-21

## 2021-10-21 RX ORDER — POLYETHYLENE GLYCOL 3350 17 G/17G
17 POWDER, FOR SOLUTION ORAL
Refills: 0 | Status: DISCONTINUED | OUTPATIENT
Start: 2021-10-21 | End: 2021-10-21

## 2021-10-21 RX ADMIN — Medication 1000 MILLIGRAM(S): at 09:28

## 2021-10-21 RX ADMIN — Medication 1000 MILLIGRAM(S): at 21:52

## 2021-10-21 RX ADMIN — SODIUM CHLORIDE 3 MILLILITER(S): 9 INJECTION INTRAMUSCULAR; INTRAVENOUS; SUBCUTANEOUS at 05:24

## 2021-10-21 RX ADMIN — LACTULOSE 20 GRAM(S): 10 SOLUTION ORAL at 11:04

## 2021-10-21 RX ADMIN — Medication 100 MILLIGRAM(S): at 15:02

## 2021-10-21 RX ADMIN — LACTULOSE 20 GRAM(S): 10 SOLUTION ORAL at 06:15

## 2021-10-21 RX ADMIN — SODIUM CHLORIDE 3 MILLILITER(S): 9 INJECTION INTRAMUSCULAR; INTRAVENOUS; SUBCUTANEOUS at 21:36

## 2021-10-21 RX ADMIN — Medication 1000 MILLIGRAM(S): at 05:00

## 2021-10-21 RX ADMIN — Medication 1000 MILLIGRAM(S): at 15:57

## 2021-10-21 RX ADMIN — Medication 100 MILLIGRAM(S): at 06:17

## 2021-10-21 RX ADMIN — Medication 200 MILLIGRAM(S): at 05:24

## 2021-10-21 RX ADMIN — SODIUM CHLORIDE 3 MILLILITER(S): 9 INJECTION INTRAMUSCULAR; INTRAVENOUS; SUBCUTANEOUS at 15:57

## 2021-10-21 RX ADMIN — Medication 1000 MILLIGRAM(S): at 21:26

## 2021-10-21 RX ADMIN — Medication 1000 MILLIGRAM(S): at 08:36

## 2021-10-21 RX ADMIN — CHLORHEXIDINE GLUCONATE 1 APPLICATION(S): 213 SOLUTION TOPICAL at 11:05

## 2021-10-21 RX ADMIN — HEPARIN SODIUM 5000 UNIT(S): 5000 INJECTION INTRAVENOUS; SUBCUTANEOUS at 21:26

## 2021-10-21 RX ADMIN — HEPARIN SODIUM 5000 UNIT(S): 5000 INJECTION INTRAVENOUS; SUBCUTANEOUS at 05:24

## 2021-10-21 RX ADMIN — Medication 1000 MILLIGRAM(S): at 15:09

## 2021-10-21 RX ADMIN — SIMETHICONE 80 MILLIGRAM(S): 80 TABLET, CHEWABLE ORAL at 14:58

## 2021-10-21 RX ADMIN — Medication 100 MILLIGRAM(S): at 21:26

## 2021-10-21 RX ADMIN — HEPARIN SODIUM 5000 UNIT(S): 5000 INJECTION INTRAVENOUS; SUBCUTANEOUS at 14:59

## 2021-10-21 NOTE — PROGRESS NOTE ADULT - ASSESSMENT
65F on daily calcium 1200 coming into the ED presenting with 3 weeks abdominal pain, constipation, and distension. Concern for stercoral colitis vs. mass in rectum. Most likely large stool burden from constipation. Patient now s/p flexible sigmoidoscopy for calcified stool mass 26cm from anal verge. Unable to endoscopically remove the mass (10/15). Patient is now s/p laparoscopic colotomy and excision of obstructing fecalith on 10/17.       PLAN  - multimodal pain control PRN (+tramadol today)  - LRD + miralax TID  - DVT ppx w/SQH  - OOBAT  - f/u AM labs, replete prn  - can be discharged home with po abx if feels well and continue to have gi fx and tolerates a diet     [facility]   [id number] 65F on daily calcium 1200 coming into the ED presenting with 3 weeks abdominal pain, constipation, and distension. Concern for stercoral colitis vs. mass in rectum. Most likely large stool burden from constipation. Patient now s/p flexible sigmoidoscopy for calcified stool mass 26cm from anal verge. Unable to endoscopically remove the mass (10/15). Patient is now s/p laparoscopic colotomy and excision of obstructing fecalith on 10/17.       PLAN  - multimodal pain control PRN (+tramadol today)  - LRD   - increase miralax to 5 times daily q3h  - DVT ppx w/SQH  - OOBAT  - f/u AM labs, replete prn  - can be discharged home with po abx if feels well and continue to have gi fx and tolerates a diet     [facility]   [id number] 65F on daily calcium 1200 coming into the ED presenting with 3 weeks abdominal pain, constipation, and distension. Concern for stercoral colitis vs. mass in rectum. Most likely large stool burden from constipation. Patient now s/p flexible sigmoidoscopy for calcified stool mass 26cm from anal verge. Unable to endoscopically remove the mass (10/15). Patient is now s/p laparoscopic colotomy and excision of obstructing fecalith on 10/17.       PLAN  - multimodal pain control PRN (+tramadol today)  - LRD   - change miralax to lactulose  - DVT ppx w/SQH  - OOBAT  - f/u AM labs, replete prn  - can be discharged home with po abx if feels well and continue to have gi fx and tolerates a diet     [facility]   [id number]

## 2021-10-21 NOTE — PROGRESS NOTE ADULT - SUBJECTIVE AND OBJECTIVE BOX
24h Events:  No acute events overnight.    Subjective:   Patient seen at bedside this AM. Tolerating diet with flatus. Denies bowel movement Denies n/v, SOB, CP, fevers/chills.    Objective:  Vital Signs  T(C): 36.7 (10-21 @ 00:44), Max: 36.8 (10-20 @ 10:07)  HR: 74 (10-21 @ 00:44) (74 - 86)  BP: 149/85 (10-21 @ 00:44) (137/69 - 153/94)  RR: 18 (10-21 @ 00:44) (18 - 18)  SpO2: 95% (10-21 @ 00:44) (93% - 95%)  10-19-21 @ 07:01  -  10-20-21 @ 07:00  --------------------------------------------------------  IN:  Total IN: 0 mL    OUT:    Voided (mL): 700 mL  Total OUT: 700 mL    Total NET: -700 mL      10-20-21 @ 07:01  -  10-21-21 @ 01:23  --------------------------------------------------------  IN:  Total IN: 0 mL    OUT:    Voided (mL): 300 mL  Total OUT: 300 mL    Total NET: -300 mL      Physical Exam:  GEN:  no distress  RESP:   clear  ABD:  soft, minimal distension, no peritoneal findings  EXTR:  no swelling  NEURO:   wnl      Labs:                        10.5   11.47 )-----------( 315      ( 20 Oct 2021 07:11 )             33.7   10-20    136  |  101  |  8   ----------------------------<  141<H>  3.6   |  24  |  0.61    Ca    8.4      20 Oct 2021 07:11  Phos  2.7     10-20  Mg     1.8     10-20      CAPILLARY BLOOD GLUCOSE          Imaging:     24h Events:  No acute events overnight.    Subjective:   Patient seen at bedside this AM. Tolerating diet with flatus. No bowel movement. Denies n/v, SOB, CP, fevers/chills.    Objective:  Vital Signs  T(C): 36.7 (10-21 @ 00:44), Max: 36.8 (10-20 @ 10:07)  HR: 74 (10-21 @ 00:44) (74 - 86)  BP: 149/85 (10-21 @ 00:44) (137/69 - 153/94)  RR: 18 (10-21 @ 00:44) (18 - 18)  SpO2: 95% (10-21 @ 00:44) (93% - 95%)  10-19-21 @ 07:01  -  10-20-21 @ 07:00  --------------------------------------------------------  IN:  Total IN: 0 mL    OUT:    Voided (mL): 700 mL  Total OUT: 700 mL    Total NET: -700 mL      10-20-21 @ 07:01  -  10-21-21 @ 01:23  --------------------------------------------------------  IN:  Total IN: 0 mL    OUT:    Voided (mL): 300 mL  Total OUT: 300 mL    Total NET: -300 mL      Physical Exam:  GEN:  no distress  RESP:   clear  ABD:  soft, minimal distension, no peritoneal findings  EXTR:  no swelling  NEURO:   wnl      Labs:                        10.5   11.47 )-----------( 315      ( 20 Oct 2021 07:11 )             33.7   10-20    136  |  101  |  8   ----------------------------<  141<H>  3.6   |  24  |  0.61    Ca    8.4      20 Oct 2021 07:11  Phos  2.7     10-20  Mg     1.8     10-20      CAPILLARY BLOOD GLUCOSE          Imaging:

## 2021-10-22 LAB
ANION GAP SERPL CALC-SCNC: 14 MMOL/L — SIGNIFICANT CHANGE UP (ref 5–17)
APPEARANCE UR: CLEAR — SIGNIFICANT CHANGE UP
BILIRUB UR-MCNC: NEGATIVE — SIGNIFICANT CHANGE UP
BUN SERPL-MCNC: 10 MG/DL — SIGNIFICANT CHANGE UP (ref 7–23)
CALCIUM SERPL-MCNC: 8.5 MG/DL — SIGNIFICANT CHANGE UP (ref 8.4–10.5)
CHLORIDE SERPL-SCNC: 101 MMOL/L — SIGNIFICANT CHANGE UP (ref 96–108)
CO2 SERPL-SCNC: 23 MMOL/L — SIGNIFICANT CHANGE UP (ref 22–31)
COLOR SPEC: YELLOW — SIGNIFICANT CHANGE UP
CREAT SERPL-MCNC: 0.59 MG/DL — SIGNIFICANT CHANGE UP (ref 0.5–1.3)
DIFF PNL FLD: NEGATIVE — SIGNIFICANT CHANGE UP
GLUCOSE SERPL-MCNC: 107 MG/DL — HIGH (ref 70–99)
GLUCOSE UR QL: NEGATIVE — SIGNIFICANT CHANGE UP
HCT VFR BLD CALC: 35.6 % — SIGNIFICANT CHANGE UP (ref 34.5–45)
HGB BLD-MCNC: 11.2 G/DL — LOW (ref 11.5–15.5)
KETONES UR-MCNC: ABNORMAL
LEUKOCYTE ESTERASE UR-ACNC: NEGATIVE — SIGNIFICANT CHANGE UP
MAGNESIUM SERPL-MCNC: 2.2 MG/DL — SIGNIFICANT CHANGE UP (ref 1.6–2.6)
MCHC RBC-ENTMCNC: 26.2 PG — LOW (ref 27–34)
MCHC RBC-ENTMCNC: 31.5 GM/DL — LOW (ref 32–36)
MCV RBC AUTO: 83.4 FL — SIGNIFICANT CHANGE UP (ref 80–100)
NITRITE UR-MCNC: NEGATIVE — SIGNIFICANT CHANGE UP
NRBC # BLD: 0 /100 WBCS — SIGNIFICANT CHANGE UP (ref 0–0)
PH UR: 6.5 — SIGNIFICANT CHANGE UP (ref 5–8)
PHOSPHATE SERPL-MCNC: 3.1 MG/DL — SIGNIFICANT CHANGE UP (ref 2.5–4.5)
PLATELET # BLD AUTO: 381 K/UL — SIGNIFICANT CHANGE UP (ref 150–400)
POTASSIUM SERPL-MCNC: 3.9 MMOL/L — SIGNIFICANT CHANGE UP (ref 3.5–5.3)
POTASSIUM SERPL-SCNC: 3.9 MMOL/L — SIGNIFICANT CHANGE UP (ref 3.5–5.3)
PROT UR-MCNC: SIGNIFICANT CHANGE UP
RBC # BLD: 4.27 M/UL — SIGNIFICANT CHANGE UP (ref 3.8–5.2)
RBC # FLD: 14.9 % — HIGH (ref 10.3–14.5)
SODIUM SERPL-SCNC: 138 MMOL/L — SIGNIFICANT CHANGE UP (ref 135–145)
SP GR SPEC: 1.02 — SIGNIFICANT CHANGE UP (ref 1.01–1.02)
SURGICAL PATHOLOGY STUDY: SIGNIFICANT CHANGE UP
UROBILINOGEN FLD QL: ABNORMAL
WBC # BLD: 14.24 K/UL — HIGH (ref 3.8–10.5)
WBC # FLD AUTO: 14.24 K/UL — HIGH (ref 3.8–10.5)

## 2021-10-22 PROCEDURE — 74177 CT ABD & PELVIS W/CONTRAST: CPT | Mod: 26

## 2021-10-22 RX ORDER — TRAMADOL HYDROCHLORIDE 50 MG/1
0.5 TABLET ORAL
Qty: 20 | Refills: 0
Start: 2021-10-22 | End: 2021-10-26

## 2021-10-22 RX ORDER — ACETAMINOPHEN 500 MG
2 TABLET ORAL
Qty: 0 | Refills: 0 | DISCHARGE
Start: 2021-10-22

## 2021-10-22 RX ORDER — IBUPROFEN 200 MG
600 TABLET ORAL ONCE
Refills: 0 | Status: COMPLETED | OUTPATIENT
Start: 2021-10-22 | End: 2021-10-22

## 2021-10-22 RX ORDER — LACTULOSE 10 G/15ML
30 SOLUTION ORAL
Qty: 1680 | Refills: 0
Start: 2021-10-22 | End: 2021-11-04

## 2021-10-22 RX ADMIN — Medication 1000 MILLIGRAM(S): at 22:25

## 2021-10-22 RX ADMIN — Medication 1000 MILLIGRAM(S): at 05:16

## 2021-10-22 RX ADMIN — SODIUM CHLORIDE 3 MILLILITER(S): 9 INJECTION INTRAMUSCULAR; INTRAVENOUS; SUBCUTANEOUS at 06:00

## 2021-10-22 RX ADMIN — Medication 200 MILLIGRAM(S): at 05:17

## 2021-10-22 RX ADMIN — HEPARIN SODIUM 5000 UNIT(S): 5000 INJECTION INTRAVENOUS; SUBCUTANEOUS at 05:16

## 2021-10-22 RX ADMIN — Medication 100 MILLIGRAM(S): at 14:24

## 2021-10-22 RX ADMIN — Medication 600 MILLIGRAM(S): at 04:33

## 2021-10-22 RX ADMIN — Medication 1000 MILLIGRAM(S): at 21:40

## 2021-10-22 RX ADMIN — Medication 1000 MILLIGRAM(S): at 05:59

## 2021-10-22 RX ADMIN — SODIUM CHLORIDE 3 MILLILITER(S): 9 INJECTION INTRAMUSCULAR; INTRAVENOUS; SUBCUTANEOUS at 22:25

## 2021-10-22 RX ADMIN — LACTULOSE 20 GRAM(S): 10 SOLUTION ORAL at 05:17

## 2021-10-22 RX ADMIN — HEPARIN SODIUM 5000 UNIT(S): 5000 INJECTION INTRAVENOUS; SUBCUTANEOUS at 14:10

## 2021-10-22 RX ADMIN — Medication 200 MILLIGRAM(S): at 17:54

## 2021-10-22 RX ADMIN — Medication 100 MILLIGRAM(S): at 05:16

## 2021-10-22 RX ADMIN — HEPARIN SODIUM 5000 UNIT(S): 5000 INJECTION INTRAVENOUS; SUBCUTANEOUS at 21:40

## 2021-10-22 RX ADMIN — CHLORHEXIDINE GLUCONATE 1 APPLICATION(S): 213 SOLUTION TOPICAL at 14:44

## 2021-10-22 RX ADMIN — LACTULOSE 20 GRAM(S): 10 SOLUTION ORAL at 01:34

## 2021-10-22 RX ADMIN — Medication 600 MILLIGRAM(S): at 01:27

## 2021-10-22 RX ADMIN — Medication 100 MILLIGRAM(S): at 21:40

## 2021-10-22 RX ADMIN — Medication 1000 MILLIGRAM(S): at 17:54

## 2021-10-22 RX ADMIN — LACTULOSE 20 GRAM(S): 10 SOLUTION ORAL at 17:54

## 2021-10-22 RX ADMIN — Medication 1000 MILLIGRAM(S): at 17:55

## 2021-10-22 RX ADMIN — SODIUM CHLORIDE 3 MILLILITER(S): 9 INJECTION INTRAMUSCULAR; INTRAVENOUS; SUBCUTANEOUS at 14:46

## 2021-10-22 NOTE — PROGRESS NOTE ADULT - SUBJECTIVE AND OBJECTIVE BOX
24h Events:  No acute events overnight.    Subjective:   Patient seen at bedside this AM. Denies n/v, passing flatus, denies bowel movements.     Objective:  Vital Signs  T(C): 36.9 (10-21 @ 21:49), Max: 37.1 (10-21 @ 16:45)  HR: 78 (10-21 @ 21:49) (73 - 86)  BP: 138/83 (10-21 @ 21:49) (134/82 - 145/87)  RR: 18 (10-21 @ 21:49) (18 - 18)  SpO2: 95% (10-21 @ 21:49) (93% - 96%)  10-20-21 @ 07:01  -  10-21-21 @ 07:00  --------------------------------------------------------  IN:  Total IN: 0 mL    OUT:    Voided (mL): 300 mL  Total OUT: 300 mL    Total NET: -300 mL        Physical Exam:  GEN:  no distress  RESP:   clear  ABD:  soft, minimal distension, no peritoneal findings  EXTR:  no swelling  NEURO:   wnl    Labs:                        10.0   11.05 )-----------( 270      ( 21 Oct 2021 07:23 )             32.3   10-21    137  |  104  |  9   ----------------------------<  100<H>  3.8   |  22  |  0.54    Ca    8.1<L>      21 Oct 2021 07:23  Phos  3.2     10-21  Mg     2.2     10-21      CAPILLARY BLOOD GLUCOSE          Imaging:     24h Events:  No acute events overnight.    Subjective:   Patient seen at bedside this AM. Denies n/v, passing flatus, had bowel movements.     Objective:  Vital Signs  T(C): 36.9 (10-21 @ 21:49), Max: 37.1 (10-21 @ 16:45)  HR: 78 (10-21 @ 21:49) (73 - 86)  BP: 138/83 (10-21 @ 21:49) (134/82 - 145/87)  RR: 18 (10-21 @ 21:49) (18 - 18)  SpO2: 95% (10-21 @ 21:49) (93% - 96%)  10-20-21 @ 07:01  -  10-21-21 @ 07:00  --------------------------------------------------------  IN:  Total IN: 0 mL    OUT:    Voided (mL): 300 mL  Total OUT: 300 mL    Total NET: -300 mL        Physical Exam:  GEN:  no distress  RESP:   clear  ABD:  soft, minimal distension, no peritoneal findings  EXTR:  no swelling  NEURO:   wnl    Labs:                        10.0   11.05 )-----------( 270      ( 21 Oct 2021 07:23 )             32.3   10-21    137  |  104  |  9   ----------------------------<  100<H>  3.8   |  22  |  0.54    Ca    8.1<L>      21 Oct 2021 07:23  Phos  3.2     10-21  Mg     2.2     10-21      CAPILLARY BLOOD GLUCOSE          Imaging:     24h Events:  No acute events overnight.    Subjective:   Patient seen at bedside this AM. Denies n/v, passing flatus, had bowel movements. Rising leukocytosis up to 14K.     Objective:  Vital Signs  T(C): 36.9 (10-21 @ 21:49), Max: 37.1 (10-21 @ 16:45)  HR: 78 (10-21 @ 21:49) (73 - 86)  BP: 138/83 (10-21 @ 21:49) (134/82 - 145/87)  RR: 18 (10-21 @ 21:49) (18 - 18)  SpO2: 95% (10-21 @ 21:49) (93% - 96%)  10-20-21 @ 07:01  -  10-21-21 @ 07:00  --------------------------------------------------------  IN:  Total IN: 0 mL    OUT:    Voided (mL): 300 mL  Total OUT: 300 mL    Total NET: -300 mL        Physical Exam:  GEN:  no distress  RESP:   clear  ABD:  soft, minimal distension, no peritoneal findings  EXTR:  no swelling  NEURO:   wnl    Labs:                        10.0   11.05 )-----------( 270      ( 21 Oct 2021 07:23 )             32.3   10-21    137  |  104  |  9   ----------------------------<  100<H>  3.8   |  22  |  0.54    Ca    8.1<L>      21 Oct 2021 07:23  Phos  3.2     10-21  Mg     2.2     10-21      CAPILLARY BLOOD GLUCOSE          Imaging:

## 2021-10-22 NOTE — PROGRESS NOTE ADULT - ASSESSMENT
65F on daily calcium 1200 coming into the ED presenting with 3 weeks abdominal pain, constipation, and distension. Concern for stercoral colitis vs. mass in rectum. Most likely large stool burden from constipation. Patient now s/p flexible sigmoidoscopy for calcified stool mass 26cm from anal verge. Unable to endoscopically remove the mass (10/15). Patient is now s/p laparoscopic colotomy and excision of obstructing fecalith on 10/17.       PLAN  - multimodal pain control PRN   - LRD   - change miralax to lactulose  - DVT ppx w/SQH  - OOBAT  - f/u AM labs, replete prn  - can be discharged home with po abx if feels well and continue to have gi fx and tolerates a diet     [facility]   [id number]   65F on daily calcium 1200 coming into the ED presenting with 3 weeks abdominal pain, constipation, and distension. Concern for stercoral colitis vs. mass in rectum. Most likely large stool burden from constipation. Patient now s/p flexible sigmoidoscopy for calcified stool mass 26cm from anal verge. Unable to endoscopically remove the mass (10/15). Patient is now s/p laparoscopic colotomy and excision of obstructing fecalith on 10/17.       PLAN  - multimodal pain control PRN   - LRD   - change miralax to lactulose  - DVT ppx w/SQH  - OOBAT  - f/u AM labs, replete prn  - will obtain a CT abd/pelvis with oral and IV contrast     [facility]   [id number]   65F on daily calcium 1200 coming into the ED presenting with 3 weeks abdominal pain, constipation, and distension. Concern for stercoral colitis vs. mass in rectum. Most likely large stool burden from constipation. Patient now s/p flexible sigmoidoscopy for calcified stool mass 26cm from anal verge. Unable to endoscopically remove the mass (10/15). Patient is now s/p laparoscopic colotomy and excision of obstructing fecalith on 10/17.       PLAN  - multimodal pain control PRN   - LRD   - change miralax to lactulose  - continue abx   - DVT ppx w/SQH  - OOBAT  - f/u AM labs, replete prn  - will obtain a CT abd/pelvis with oral and IV contrast     [facility]   [id number]

## 2021-10-22 NOTE — PROGRESS NOTE ADULT - ATTENDING COMMENTS
As above  Impacted large calcified fecalith s/p unsuccessfully endoscopic removal  Now s/p colotomy, intra-op colonoscopy, and successful fecalith removal    Further care per colorectal surgery team    Thank you for this interesting consult.  Please call the advanced GI service with any questions or concerns.
Repeat CT scan this morning shows no movement of the fecalith.  Patient will need surgery.  Indications, risk, benefits, alternatives for diagnostic laparoscopy, possible laparotomy, possible anterior resection, possible intraoperative colonoscopy, possible ostomy reviewed including but not limited to bleeding, infection, anastomotic leak, and change in bowel function.  All questions answered
Difficult mobility because of incisional pain.  Continue diet.  Has bowel function.  Abdomen soft with incisional tenderness.  Home when pain better controlled and patient more ambulatory
possible dc later if does well
Had BMs and feels better but WBC up to 14.  No localizing symptoms.  Will get CT abd and pelvis
Patient has not passed the calcified stool in the rectosigmoid.  If flex sig today unsuccessful, may try gentle prep to try to clear colon above the impaction since patient is passing stool and gas around the calcified stool in preparation for surgical removal
Patient seen and examined at 7:30 this morning.  Was mildly distended and stool filled right colon on axr last night.  Miralax not working.  Lactulose prescribed.  Home with BMs
66yo F PMH HLD presents with LBO secondary to calcified, obstruction fecalith in sigmoid colon. S/p attempted endoscopic removal yesterday without success. Also notable for deeply ulcerated colon underneath fecalith.  S/p bowel prep overnight - more distended this morning but still fairly comfortable.  Passing liquid stool only.  Planned tentatively for OR tomorrow with Dr. Ngo.     Thank you for this interesting consult.  Please call the advanced GI service with any questions or concerns.
Patient appears to have calcified stool that was previously in the cecum and is now partially obstructing in the rectosigmoid.  This is most likely related to the patient's high oral calcium intake.  Abdomen is soft and nontender.  Will try water-soluble contrast enema to help the stool pass.  If that is unsuccessful, advanced GI consulted for possible endoscopic decompression.  If nonoperative means to get stool to pass are unsuccessful then surgery will be needed.
Patient more distended this morning.  Nontender.  Stat CT shows: Still filled with liquid stool.  Patient seen after CT scan less distended.  She had multiple loose bowel movements and continues to pass flatus.  Will continue to observe the patient with IV fluids and IV antibiotics.  If she does not pass the impacted stool then surgery tomorrow.  Hopefully, the colon will continue to decompress from the bowel prep prior to surgery.
Looks and feels well except for incisional pain.  Advance diet as tolerated and add miralax to ensure clean out of any remaining firm stool

## 2021-10-23 ENCOUNTER — TRANSCRIPTION ENCOUNTER (OUTPATIENT)
Age: 66
End: 2021-10-23

## 2021-10-23 VITALS
SYSTOLIC BLOOD PRESSURE: 128 MMHG | RESPIRATION RATE: 18 BRPM | DIASTOLIC BLOOD PRESSURE: 78 MMHG | TEMPERATURE: 98 F | HEART RATE: 88 BPM | OXYGEN SATURATION: 97 %

## 2021-10-23 LAB
ANION GAP SERPL CALC-SCNC: 12 MMOL/L — SIGNIFICANT CHANGE UP (ref 5–17)
BUN SERPL-MCNC: 9 MG/DL — SIGNIFICANT CHANGE UP (ref 7–23)
CALCIUM SERPL-MCNC: 8.5 MG/DL — SIGNIFICANT CHANGE UP (ref 8.4–10.5)
CHLORIDE SERPL-SCNC: 102 MMOL/L — SIGNIFICANT CHANGE UP (ref 96–108)
CO2 SERPL-SCNC: 24 MMOL/L — SIGNIFICANT CHANGE UP (ref 22–31)
CREAT SERPL-MCNC: 0.53 MG/DL — SIGNIFICANT CHANGE UP (ref 0.5–1.3)
GLUCOSE SERPL-MCNC: 104 MG/DL — HIGH (ref 70–99)
HCT VFR BLD CALC: 35.2 % — SIGNIFICANT CHANGE UP (ref 34.5–45)
HGB BLD-MCNC: 10.7 G/DL — LOW (ref 11.5–15.5)
MAGNESIUM SERPL-MCNC: 2.2 MG/DL — SIGNIFICANT CHANGE UP (ref 1.6–2.6)
MCHC RBC-ENTMCNC: 25.5 PG — LOW (ref 27–34)
MCHC RBC-ENTMCNC: 30.4 GM/DL — LOW (ref 32–36)
MCV RBC AUTO: 83.8 FL — SIGNIFICANT CHANGE UP (ref 80–100)
NRBC # BLD: 0 /100 WBCS — SIGNIFICANT CHANGE UP (ref 0–0)
PHOSPHATE SERPL-MCNC: 3.5 MG/DL — SIGNIFICANT CHANGE UP (ref 2.5–4.5)
PLATELET # BLD AUTO: 377 K/UL — SIGNIFICANT CHANGE UP (ref 150–400)
POTASSIUM SERPL-MCNC: 3.9 MMOL/L — SIGNIFICANT CHANGE UP (ref 3.5–5.3)
POTASSIUM SERPL-SCNC: 3.9 MMOL/L — SIGNIFICANT CHANGE UP (ref 3.5–5.3)
RBC # BLD: 4.2 M/UL — SIGNIFICANT CHANGE UP (ref 3.8–5.2)
RBC # FLD: 15.2 % — HIGH (ref 10.3–14.5)
SODIUM SERPL-SCNC: 138 MMOL/L — SIGNIFICANT CHANGE UP (ref 135–145)
WBC # BLD: 12.52 K/UL — HIGH (ref 3.8–10.5)
WBC # FLD AUTO: 12.52 K/UL — HIGH (ref 3.8–10.5)

## 2021-10-23 PROCEDURE — 83605 ASSAY OF LACTIC ACID: CPT

## 2021-10-23 PROCEDURE — 74018 RADEX ABDOMEN 1 VIEW: CPT | Mod: 26

## 2021-10-23 PROCEDURE — 83735 ASSAY OF MAGNESIUM: CPT

## 2021-10-23 PROCEDURE — U0003: CPT

## 2021-10-23 PROCEDURE — 93970 EXTREMITY STUDY: CPT | Mod: 26

## 2021-10-23 PROCEDURE — 87635 SARS-COV-2 COVID-19 AMP PRB: CPT

## 2021-10-23 PROCEDURE — 86901 BLOOD TYPING SEROLOGIC RH(D): CPT

## 2021-10-23 PROCEDURE — 80048 BASIC METABOLIC PNL TOTAL CA: CPT

## 2021-10-23 PROCEDURE — 85014 HEMATOCRIT: CPT

## 2021-10-23 PROCEDURE — 82330 ASSAY OF CALCIUM: CPT

## 2021-10-23 PROCEDURE — 86850 RBC ANTIBODY SCREEN: CPT

## 2021-10-23 PROCEDURE — 86880 COOMBS TEST DIRECT: CPT

## 2021-10-23 PROCEDURE — 74270 X-RAY XM COLON 1CNTRST STD: CPT

## 2021-10-23 PROCEDURE — 74176 CT ABD & PELVIS W/O CONTRAST: CPT

## 2021-10-23 PROCEDURE — 86922 COMPATIBILITY TEST ANTIGLOB: CPT

## 2021-10-23 PROCEDURE — 99285 EMERGENCY DEPT VISIT HI MDM: CPT | Mod: 25

## 2021-10-23 PROCEDURE — 93970 EXTREMITY STUDY: CPT

## 2021-10-23 PROCEDURE — 88300 SURGICAL PATH GROSS: CPT

## 2021-10-23 PROCEDURE — 82435 ASSAY OF BLOOD CHLORIDE: CPT

## 2021-10-23 PROCEDURE — 85730 THROMBOPLASTIN TIME PARTIAL: CPT

## 2021-10-23 PROCEDURE — 71045 X-RAY EXAM CHEST 1 VIEW: CPT

## 2021-10-23 PROCEDURE — 85610 PROTHROMBIN TIME: CPT

## 2021-10-23 PROCEDURE — 86905 BLOOD TYPING RBC ANTIGENS: CPT

## 2021-10-23 PROCEDURE — 82947 ASSAY GLUCOSE BLOOD QUANT: CPT

## 2021-10-23 PROCEDURE — 85018 HEMOGLOBIN: CPT

## 2021-10-23 PROCEDURE — 85025 COMPLETE CBC W/AUTO DIFF WBC: CPT

## 2021-10-23 PROCEDURE — 80053 COMPREHEN METABOLIC PANEL: CPT

## 2021-10-23 PROCEDURE — 84132 ASSAY OF SERUM POTASSIUM: CPT

## 2021-10-23 PROCEDURE — 74177 CT ABD & PELVIS W/CONTRAST: CPT | Mod: MA

## 2021-10-23 PROCEDURE — 82803 BLOOD GASES ANY COMBINATION: CPT

## 2021-10-23 PROCEDURE — U0005: CPT

## 2021-10-23 PROCEDURE — 96374 THER/PROPH/DIAG INJ IV PUSH: CPT

## 2021-10-23 PROCEDURE — 36415 COLL VENOUS BLD VENIPUNCTURE: CPT

## 2021-10-23 PROCEDURE — C1889: CPT

## 2021-10-23 PROCEDURE — 85027 COMPLETE CBC AUTOMATED: CPT

## 2021-10-23 PROCEDURE — 81001 URINALYSIS AUTO W/SCOPE: CPT

## 2021-10-23 PROCEDURE — 36569 INSJ PICC 5 YR+ W/O IMAGING: CPT

## 2021-10-23 PROCEDURE — 86900 BLOOD TYPING SEROLOGIC ABO: CPT

## 2021-10-23 PROCEDURE — 86803 HEPATITIS C AB TEST: CPT

## 2021-10-23 PROCEDURE — 86769 SARS-COV-2 COVID-19 ANTIBODY: CPT

## 2021-10-23 PROCEDURE — 93005 ELECTROCARDIOGRAM TRACING: CPT

## 2021-10-23 PROCEDURE — 81003 URINALYSIS AUTO W/O SCOPE: CPT

## 2021-10-23 PROCEDURE — C1751: CPT

## 2021-10-23 PROCEDURE — 83690 ASSAY OF LIPASE: CPT

## 2021-10-23 PROCEDURE — 84100 ASSAY OF PHOSPHORUS: CPT

## 2021-10-23 PROCEDURE — 74018 RADEX ABDOMEN 1 VIEW: CPT

## 2021-10-23 PROCEDURE — 84295 ASSAY OF SERUM SODIUM: CPT

## 2021-10-23 PROCEDURE — C9399: CPT

## 2021-10-23 PROCEDURE — 86870 RBC ANTIBODY IDENTIFICATION: CPT

## 2021-10-23 RX ORDER — LACTULOSE 10 G/15ML
30 SOLUTION ORAL
Qty: 1680 | Refills: 0
Start: 2021-10-23 | End: 2021-11-05

## 2021-10-23 RX ORDER — METRONIDAZOLE 500 MG
500 TABLET ORAL EVERY 12 HOURS
Refills: 0 | Status: DISCONTINUED | OUTPATIENT
Start: 2021-10-23 | End: 2021-10-23

## 2021-10-23 RX ORDER — CIPROFLOXACIN LACTATE 400MG/40ML
500 VIAL (ML) INTRAVENOUS EVERY 12 HOURS
Refills: 0 | Status: DISCONTINUED | OUTPATIENT
Start: 2021-10-23 | End: 2021-10-23

## 2021-10-23 RX ORDER — CIPROFLOXACIN LACTATE 400MG/40ML
1 VIAL (ML) INTRAVENOUS
Qty: 14 | Refills: 0
Start: 2021-10-23 | End: 2021-10-29

## 2021-10-23 RX ORDER — TRAMADOL HYDROCHLORIDE 50 MG/1
25 TABLET ORAL EVERY 6 HOURS
Refills: 0 | Status: DISCONTINUED | OUTPATIENT
Start: 2021-10-23 | End: 2021-10-23

## 2021-10-23 RX ORDER — TRAMADOL HYDROCHLORIDE 50 MG/1
50 TABLET ORAL EVERY 6 HOURS
Refills: 0 | Status: DISCONTINUED | OUTPATIENT
Start: 2021-10-23 | End: 2021-10-23

## 2021-10-23 RX ORDER — METRONIDAZOLE 500 MG
1 TABLET ORAL
Qty: 14 | Refills: 0
Start: 2021-10-23 | End: 2021-10-29

## 2021-10-23 RX ADMIN — TRAMADOL HYDROCHLORIDE 25 MILLIGRAM(S): 50 TABLET ORAL at 05:33

## 2021-10-23 RX ADMIN — LACTULOSE 20 GRAM(S): 10 SOLUTION ORAL at 05:29

## 2021-10-23 RX ADMIN — Medication 500 MILLIGRAM(S): at 18:07

## 2021-10-23 RX ADMIN — Medication 200 MILLIGRAM(S): at 05:29

## 2021-10-23 RX ADMIN — TRAMADOL HYDROCHLORIDE 25 MILLIGRAM(S): 50 TABLET ORAL at 12:33

## 2021-10-23 RX ADMIN — SODIUM CHLORIDE 3 MILLILITER(S): 9 INJECTION INTRAMUSCULAR; INTRAVENOUS; SUBCUTANEOUS at 13:13

## 2021-10-23 RX ADMIN — LACTULOSE 20 GRAM(S): 10 SOLUTION ORAL at 17:32

## 2021-10-23 RX ADMIN — Medication 100 MILLIGRAM(S): at 05:28

## 2021-10-23 RX ADMIN — HEPARIN SODIUM 5000 UNIT(S): 5000 INJECTION INTRAVENOUS; SUBCUTANEOUS at 15:20

## 2021-10-23 RX ADMIN — CHLORHEXIDINE GLUCONATE 1 APPLICATION(S): 213 SOLUTION TOPICAL at 12:29

## 2021-10-23 RX ADMIN — LACTULOSE 20 GRAM(S): 10 SOLUTION ORAL at 12:28

## 2021-10-23 RX ADMIN — HEPARIN SODIUM 5000 UNIT(S): 5000 INJECTION INTRAVENOUS; SUBCUTANEOUS at 05:29

## 2021-10-23 RX ADMIN — LACTULOSE 20 GRAM(S): 10 SOLUTION ORAL at 03:03

## 2021-10-23 RX ADMIN — Medication 1000 MILLIGRAM(S): at 05:31

## 2021-10-23 RX ADMIN — Medication 1000 MILLIGRAM(S): at 12:29

## 2021-10-23 RX ADMIN — Medication 1000 MILLIGRAM(S): at 12:33

## 2021-10-23 RX ADMIN — TRAMADOL HYDROCHLORIDE 25 MILLIGRAM(S): 50 TABLET ORAL at 12:50

## 2021-10-23 RX ADMIN — Medication 1000 MILLIGRAM(S): at 18:14

## 2021-10-23 RX ADMIN — Medication 1000 MILLIGRAM(S): at 17:32

## 2021-10-23 RX ADMIN — Medication 100 MILLIGRAM(S): at 15:18

## 2021-10-23 NOTE — PROGRESS NOTE ADULT - ASSESSMENT
65F on daily calcium 1200 coming into the ED presenting with 3 weeks abdominal pain, constipation, and distension. Concern for stercoral colitis vs. mass in rectum. Most likely large stool burden from constipation. Patient now s/p flexible sigmoidoscopy for calcified stool mass 26cm from anal verge. Unable to endoscopically remove the mass (10/15). Patient is now s/p laparoscopic colotomy and excision of obstructing fecalith on 10/17.       PLAN  - multimodal pain control PRN   - LRD   - change miralax to lactulose  - continue abx   - DVT ppx w/SQH  - OOBAT  - f/u AM labs, replete prn      [facility]   [id number]

## 2021-10-23 NOTE — PROGRESS NOTE ADULT - PROVIDER SPECIALTY LIST ADULT
Anesthesia
Surgery
Gastroenterology
Gastroenterology

## 2021-10-23 NOTE — PROGRESS NOTE ADULT - SUBJECTIVE AND OBJECTIVE BOX
24h Events:  No acute events overnight.    Subjective:   Patient seen at bedside this AM. Denies n/v, tolerating diet, passing flatus, having bowel movements     Objective:  Vital Signs  T(C): 36.9 (10-22 @ 20:57), Max: 36.9 (10-22 @ 20:57)  HR: 81 (10-22 @ 20:57) (76 - 92)  BP: 147/82 (10-22 @ 20:57) (120/74 - 157/98)  RR: 18 (10-22 @ 20:57) (17 - 18)  SpO2: 96% (10-22 @ 20:57) (93% - 97%)  10-22-21 @ 07:01  -  10-23-21 @ 00:15  --------------------------------------------------------  IN:  Total IN: 0 mL    OUT:    Voided (mL): 150 mL  Total OUT: 150 mL    Total NET: -150 mL      Physical Exam:  GEN:  no distress  RESP:   clear  ABD:  soft, minimal distension, no peritoneal findings  EXTR:  no swelling  NEURO:   wnl      Labs:                        11.2   14.24 )-----------( 381      ( 22 Oct 2021 07:03 )             35.6   10-22    138  |  101  |  10  ----------------------------<  107<H>  3.9   |  23  |  0.59    Ca    8.5      22 Oct 2021 07:03  Phos  3.1     10-22  Mg     2.2     10-22      CAPILLARY BLOOD GLUCOSE          Imaging:

## 2021-10-23 NOTE — DISCHARGE NOTE NURSING/CASE MANAGEMENT/SOCIAL WORK - PATIENT PORTAL LINK FT
You can access the FollowMyHealth Patient Portal offered by [facility] by registering at the following website: [email or web address]. By joining [employer]’s FollowMyHealth portal, you will also be able to view your health information using other applications (apps) compatible with our system.

## 2021-10-23 NOTE — PROGRESS NOTE ADULT - REASON FOR ADMISSION
Stercoral Colitis

## 2021-11-03 ENCOUNTER — APPOINTMENT (OUTPATIENT)
Dept: SURGERY | Facility: CLINIC | Age: 66
End: 2021-11-03
Payer: MEDICAID

## 2021-11-03 VITALS
DIASTOLIC BLOOD PRESSURE: 82 MMHG | SYSTOLIC BLOOD PRESSURE: 132 MMHG | OXYGEN SATURATION: 100 % | HEART RATE: 81 BPM | HEIGHT: 64 IN | BODY MASS INDEX: 30.73 KG/M2 | WEIGHT: 180 LBS | TEMPERATURE: 97.8 F | RESPIRATION RATE: 17 BRPM

## 2021-11-03 DIAGNOSIS — S30.1XXA CONTUSION OF ABDOMINAL WALL, INITIAL ENCOUNTER: ICD-10-CM

## 2021-11-03 DIAGNOSIS — Z63.4 DISAPPEARANCE AND DEATH OF FAMILY MEMBER: ICD-10-CM

## 2021-11-03 DIAGNOSIS — Z78.9 OTHER SPECIFIED HEALTH STATUS: ICD-10-CM

## 2021-11-03 DIAGNOSIS — Z82.49 FAMILY HISTORY OF ISCHEMIC HEART DISEASE AND OTHER DISEASES OF THE CIRCULATORY SYSTEM: ICD-10-CM

## 2021-11-03 DIAGNOSIS — Z82.3 FAMILY HISTORY OF STROKE: ICD-10-CM

## 2021-11-03 DIAGNOSIS — Z83.3 FAMILY HISTORY OF DIABETES MELLITUS: ICD-10-CM

## 2021-11-03 DIAGNOSIS — Z87.891 PERSONAL HISTORY OF NICOTINE DEPENDENCE: ICD-10-CM

## 2021-11-03 PROCEDURE — 10140 I&D HMTMA SEROMA/FLUID COLLJ: CPT | Mod: 58

## 2021-11-03 RX ORDER — MUPIROCIN 20 MG/G
2 OINTMENT TOPICAL
Qty: 1 | Refills: 3 | Status: ACTIVE | COMMUNITY
Start: 2021-11-03 | End: 1900-01-01

## 2021-11-03 SDOH — SOCIAL STABILITY - SOCIAL INSECURITY: DISSAPEARANCE AND DEATH OF FAMILY MEMBER: Z63.4

## 2021-11-03 NOTE — ASSESSMENT
[FreeTextEntry1] : Patient doing well.  Formed bowel movements without laxatives twice daily.  Seroma drained in office, skin opened 2 locations approximately 5 mm each.  Bactroban and dry dressing.  Follow-up 3 weeks.  Colonoscopy in 2 to 3 months to check for colon healing and for screening.

## 2021-11-03 NOTE — PHYSICAL EXAM
[Abdomen Masses] : No abdominal masses [Abdomen Tenderness] : ~T No ~M abdominal tenderness [de-identified] : seroma upper portion wound

## 2021-11-03 NOTE — HISTORY OF PRESENT ILLNESS
[FreeTextEntry1] : Iveth is a 65 year old female here for post op visit s/p Laparoscopic mobilization of the splenic flexure with colotomy and excision of giant calcified fecalith, repair of umbilical hernia and lysis of adhesions 10/17/21. Pathology; giant fecolith removal- gross examination only. \par \par Last colonoscopy 10/15/21 by Dr. Kaye Aaron. \par CT abdomen pelvis 10/22/21- ascending colon markedly distended with stool with cecum measuring approximately 9 cm. postsurgical changes in the left upper quadrant with no fluid collection. appendix fluid-filled and distended which can be reactive from recent surgery, in the absence of focal pain in this region. \par \par Today pt reports feeling well, no pain. Pt reports occasional soreness and drainage. 2 formed BMs daily, no pain, no bleeding. No episodes of incontinence. Good appetite, no nausea, no vomiting.

## 2021-11-08 ENCOUNTER — APPOINTMENT (OUTPATIENT)
Dept: GASTROENTEROLOGY | Facility: CLINIC | Age: 66
End: 2021-11-08

## 2021-11-24 ENCOUNTER — APPOINTMENT (OUTPATIENT)
Dept: SURGERY | Facility: CLINIC | Age: 66
End: 2021-11-24
Payer: MEDICAID

## 2021-11-24 VITALS
DIASTOLIC BLOOD PRESSURE: 83 MMHG | HEIGHT: 64 IN | TEMPERATURE: 97.1 F | BODY MASS INDEX: 30.73 KG/M2 | HEART RATE: 76 BPM | RESPIRATION RATE: 18 BRPM | OXYGEN SATURATION: 97 % | SYSTOLIC BLOOD PRESSURE: 146 MMHG | WEIGHT: 180 LBS

## 2021-11-24 DIAGNOSIS — Z09 ENCOUNTER FOR FOLLOW-UP EXAMINATION AFTER COMPLETED TREATMENT FOR CONDITIONS OTHER THAN MALIGNANT NEOPLASM: ICD-10-CM

## 2021-11-24 DIAGNOSIS — K63.3 ULCER OF INTESTINE: ICD-10-CM

## 2021-11-24 PROCEDURE — 99024 POSTOP FOLLOW-UP VISIT: CPT

## 2021-11-24 RX ORDER — ALBUTEROL SULFATE 90 UG/1
108 (90 BASE) AEROSOL, METERED RESPIRATORY (INHALATION)
Refills: 0 | Status: ACTIVE | COMMUNITY

## 2021-11-24 NOTE — PHYSICAL EXAM
[Abdomen Masses] : No abdominal masses [Abdomen Tenderness] : ~T No ~M abdominal tenderness [de-identified] : healed wound

## 2021-11-24 NOTE — HISTORY OF PRESENT ILLNESS
[FreeTextEntry1] : Iveth is a 65 year old female here for post op visit s/p Laparoscopic mobilization of the splenic flexure with colotomy and excision of giant calcified fecalith, repair of umbilical hernia and lysis of adhesions 10/17/21. Pathology; giant fecolith removal- gross examination only. \par \par Last colonoscopy 10/15/21 by Dr. Kaye Aaron. \par CT abdomen pelvis 10/22/21- ascending colon markedly distended with stool with cecum measuring approximately 9 cm. postsurgical changes in the left upper quadrant with no fluid collection. appendix fluid-filled and distended which can be reactive from recent surgery, in the absence of focal pain in this region. \par \par Last seen 11/3/21- Patient doing well. Formed bowel movements without laxatives twice daily. Seroma drained in office, skin opened 2 locations approximately 5 mm each. Bactroban and dry dressing. Follow-up 3 weeks. Colonoscopy in 2 to 3 months to check for colon healing and for screening. \par \par Today pt reports feeling well, occasional RLQ soreness. 1-2 formed BMs daily, no pain, no bleeding. Takes Lactulose Syrup as needed. No episodes of incontinence. Good appetite, no nausea, no vomiting. Pt using Bactroban and dry dressing daily, no drainage.

## 2021-12-10 DIAGNOSIS — Z12.11 ENCOUNTER FOR SCREENING FOR MALIGNANT NEOPLASM OF COLON: ICD-10-CM

## 2021-12-10 RX ORDER — POLYETHYLENE GLYCOL-3350 AND ELECTROLYTES 236; 6.74; 5.86; 2.97; 22.74 G/274.31G; G/274.31G; G/274.31G; G/274.31G; G/274.31G
236 POWDER, FOR SOLUTION ORAL
Qty: 1 | Refills: 0 | Status: ACTIVE | COMMUNITY
Start: 2021-12-10 | End: 1900-01-01

## 2021-12-10 RX ORDER — LACTULOSE 10 G/15ML
20 SOLUTION ORAL
Qty: 30 | Refills: 0 | Status: ACTIVE | COMMUNITY
Start: 2021-12-10 | End: 1900-01-01

## 2022-01-03 DIAGNOSIS — Z12.11 ENCOUNTER FOR SCREENING FOR MALIGNANT NEOPLASM OF COLON: ICD-10-CM

## 2022-01-03 RX ORDER — SODIUM PICOSULFATE, MAGNESIUM OXIDE, AND ANHYDROUS CITRIC ACID 10; 3.5; 12 MG/160ML; G/160ML; G/160ML
10-3.5-12 MG-GM LIQUID ORAL
Qty: 1 | Refills: 0 | Status: ACTIVE | COMMUNITY
Start: 2022-01-03 | End: 1900-01-01

## 2022-01-03 RX ORDER — SODIUM PICOSULFATE, MAGNESIUM OXIDE, AND ANHYDROUS CITRIC ACID 10; 3.5; 12 MG/160ML; G/160ML; G/160ML
10-3.5-12 MG-GM LIQUID ORAL
Qty: 1 | Refills: 0 | Status: DISCONTINUED | COMMUNITY
Start: 2021-12-10 | End: 2022-01-03

## 2022-01-04 NOTE — BRIEF OPERATIVE NOTE - NSICDXBRIEFPREOP_GEN_ALL_CORE_FT
Spoke with pt that yesterday she felt dizzy she was trying to get out of bed and she felt that the room was spinning and happened again early this morning right now she is fine.  Pt is drinking enough fluid. Pt states\" when I lay on my left side my ear feels clogged and can't hear.\"  Advise to come go to Urgent Care so they can evaluate her symptoms.  I offered an apt this Friday. Pt is out of town. So scheduled pt next week 1/12.  Advised if symptoms persists to go to any Urgent care.   PRE-OP DIAGNOSIS:  Large bowel obstruction 18-Oct-2021 05:24:47  Jovon Levine  Fecal impaction 18-Oct-2021 05:25:09  Jovon Levine

## 2022-01-10 ENCOUNTER — APPOINTMENT (OUTPATIENT)
Dept: SURGERY | Facility: HOSPITAL | Age: 67
End: 2022-01-10

## 2022-01-31 ENCOUNTER — OUTPATIENT (OUTPATIENT)
Dept: OUTPATIENT SERVICES | Facility: HOSPITAL | Age: 67
LOS: 1 days | End: 2022-01-31
Payer: COMMERCIAL

## 2022-01-31 ENCOUNTER — APPOINTMENT (OUTPATIENT)
Dept: SURGERY | Facility: HOSPITAL | Age: 67
End: 2022-01-31
Payer: MEDICARE

## 2022-01-31 ENCOUNTER — RESULT REVIEW (OUTPATIENT)
Age: 67
End: 2022-01-31

## 2022-01-31 VITALS
SYSTOLIC BLOOD PRESSURE: 113 MMHG | DIASTOLIC BLOOD PRESSURE: 64 MMHG | RESPIRATION RATE: 17 BRPM | HEART RATE: 70 BPM | OXYGEN SATURATION: 110 %

## 2022-01-31 VITALS
TEMPERATURE: 97 F | OXYGEN SATURATION: 99 % | SYSTOLIC BLOOD PRESSURE: 126 MMHG | HEART RATE: 82 BPM | DIASTOLIC BLOOD PRESSURE: 81 MMHG | HEIGHT: 64 IN | WEIGHT: 179.9 LBS | RESPIRATION RATE: 18 BRPM

## 2022-01-31 DIAGNOSIS — Z96.652 PRESENCE OF LEFT ARTIFICIAL KNEE JOINT: Chronic | ICD-10-CM

## 2022-01-31 DIAGNOSIS — Z98.890 OTHER SPECIFIED POSTPROCEDURAL STATES: Chronic | ICD-10-CM

## 2022-01-31 DIAGNOSIS — K63.3 ULCER OF INTESTINE: ICD-10-CM

## 2022-01-31 PROCEDURE — 88305 TISSUE EXAM BY PATHOLOGIST: CPT

## 2022-01-31 PROCEDURE — 88305 TISSUE EXAM BY PATHOLOGIST: CPT | Mod: 26

## 2022-01-31 PROCEDURE — 45380 COLONOSCOPY AND BIOPSY: CPT | Mod: PT

## 2022-01-31 PROCEDURE — 45380 COLONOSCOPY AND BIOPSY: CPT

## 2022-01-31 DEVICE — NET RETRV ROT ROTH 2.5MMX230CM: Type: IMPLANTABLE DEVICE | Status: FUNCTIONAL

## 2022-01-31 RX ORDER — SODIUM CHLORIDE 9 MG/ML
500 INJECTION INTRAMUSCULAR; INTRAVENOUS; SUBCUTANEOUS
Refills: 0 | Status: DISCONTINUED | OUTPATIENT
Start: 2022-01-31 | End: 2022-02-14

## 2022-01-31 NOTE — PRE PROCEDURE NOTE - PRE PROCEDURE EVALUATION
Attending Physician:                        leda    Procedure:  colonoscopy    Indication for Procedure:    screening  ________________________________________________________  PAST MEDICAL & SURGICAL HISTORY:  Acute asthma exacerbation    H/O total knee replacement, left  2016    History of colon surgery      ALLERGIES:  No Known Allergies    HOME MEDICATIONS:  acetaminophen 500 mg oral tablet: 2 tab(s) orally every 6 hours    AICD/PPM: [ ] yes   [x] no    PERTINENT LAB DATA:                      PHYSICAL EXAMINATION:    Height (cm): 162.6  Weight (kg): 81.6  BMI (kg/m2): 30.9  BSA (m2): 1.87T(C): 36.2  HR: 82  BP: 126/81  RR: 18  SpO2: 99%    Constitutional: NAD  HEENT: PERRLA, EOMI,    Neck:  No JVD  Respiratory: CTAB/L  Cardiovascular: S1 and S2  Gastrointestinal: BS+, soft, NT/ND  Extremities: No peripheral edema  Neurological: A/O x 3, no focal deficits  Psychiatric: Normal mood, normal affect  Skin: No rashes    ASA Class: I [ ]  II [ ]  III [ ]  IV [ ]  per anesthesia    COMMENTS:    The patient is a suitable candidate for the planned procedure unless box checked [ ]  No, explain:

## 2022-01-31 NOTE — ASU PATIENT PROFILE, ADULT - FALL HARM RISK - UNIVERSAL INTERVENTIONS
Bed in lowest position, wheels locked, appropriate side rails in place/Call bell, personal items and telephone in reach/Instruct patient to call for assistance before getting out of bed or chair/Non-slip footwear when patient is out of bed/[place] to call system/Physically safe environment - no spills, clutter or unnecessary equipment/Purposeful Proactive Rounding/Room/bathroom lighting operational, light cord in reach

## 2022-02-03 LAB — SURGICAL PATHOLOGY STUDY: SIGNIFICANT CHANGE UP

## 2022-04-26 ENCOUNTER — TRANSCRIPTION ENCOUNTER (OUTPATIENT)
Age: 67
End: 2022-04-26

## 2022-06-13 NOTE — ED PROVIDER NOTE - CADM POA PRESS ULCER
Patient have a video appointment today at 10:30am with [facility]. Writer placed a call this morning to check in patient. Unable to get a hold of patient. Writer left a voicemail with writer's call back number.    No

## 2023-11-13 PROBLEM — J45.901 UNSPECIFIED ASTHMA WITH (ACUTE) EXACERBATION: Chronic | Status: ACTIVE | Noted: 2022-01-31

## 2023-12-21 ENCOUNTER — APPOINTMENT (OUTPATIENT)
Dept: MAMMOGRAPHY | Facility: CLINIC | Age: 68
End: 2023-12-21
Payer: MEDICARE

## 2023-12-21 PROCEDURE — 77067 SCR MAMMO BI INCL CAD: CPT

## 2023-12-21 PROCEDURE — 77063 BREAST TOMOSYNTHESIS BI: CPT

## 2024-06-06 NOTE — PATIENT PROFILE ADULT - DO YOU FEEL UNSAFE AT HOME, WORK, OR SCHOOL?
----- Message from Francy Boykin DO sent at 6/6/2024  9:01 AM EDT -----  X-ray of the arm was overall normal   no

## 2025-01-13 VITALS — WEIGHT: 175 LBS | BODY MASS INDEX: 29.88 KG/M2 | HEIGHT: 64 IN

## 2025-01-14 ENCOUNTER — APPOINTMENT (OUTPATIENT)
Dept: MAMMOGRAPHY | Facility: CLINIC | Age: 70
End: 2025-01-14
Payer: MEDICARE

## 2025-01-14 PROCEDURE — 77067 SCR MAMMO BI INCL CAD: CPT

## 2025-01-14 PROCEDURE — 77063 BREAST TOMOSYNTHESIS BI: CPT

## 2025-03-10 DIAGNOSIS — Z12.11 ENCOUNTER FOR SCREENING FOR MALIGNANT NEOPLASM OF COLON: ICD-10-CM

## 2025-03-10 RX ORDER — SODIUM PICOSULFATE, MAGNESIUM OXIDE, AND ANHYDROUS CITRIC ACID 12; 3.5; 1 G/175ML; G/175ML; MG/175ML
10-3.5-12 MG-GM LIQUID ORAL
Qty: 1 | Refills: 0 | Status: ACTIVE | COMMUNITY
Start: 2025-03-10 | End: 1900-01-01

## 2025-04-04 NOTE — PRE-ANESTHESIA EVALUATION ADULT - NSANTHOSAYNRD_GEN_A_CORE
No. [place] screening performed.  STOP BANG Legend: 0-2 = LOW Risk; 3-4 = INTERMEDIATE Risk; 5-8 = HIGH Risk
No. [place] screening performed.  STOP BANG Legend: 0-2 = LOW Risk; 3-4 = INTERMEDIATE Risk; 5-8 = HIGH Risk
Female

## 2025-06-12 RX ORDER — SODIUM PICOSULFATE, MAGNESIUM OXIDE, AND ANHYDROUS CITRIC ACID 12; 3.5; 1 G/175ML; G/175ML; MG/175ML
10-3.5-12 MG-GM LIQUID ORAL
Qty: 1 | Refills: 0 | Status: ACTIVE | COMMUNITY
Start: 2025-06-12 | End: 1900-01-01

## 2025-06-16 ENCOUNTER — TRANSCRIPTION ENCOUNTER (OUTPATIENT)
Age: 70
End: 2025-06-16

## 2025-06-16 ENCOUNTER — APPOINTMENT (OUTPATIENT)
Dept: SURGERY | Facility: HOSPITAL | Age: 70
End: 2025-06-16
Payer: MEDICARE

## 2025-06-16 ENCOUNTER — RESULT REVIEW (OUTPATIENT)
Age: 70
End: 2025-06-16

## 2025-06-16 ENCOUNTER — OUTPATIENT (OUTPATIENT)
Dept: OUTPATIENT SERVICES | Facility: HOSPITAL | Age: 70
LOS: 1 days | End: 2025-06-16
Payer: MEDICARE

## 2025-06-16 VITALS
HEIGHT: 64 IN | TEMPERATURE: 97 F | OXYGEN SATURATION: 98 % | SYSTOLIC BLOOD PRESSURE: 119 MMHG | DIASTOLIC BLOOD PRESSURE: 60 MMHG | RESPIRATION RATE: 18 BRPM | HEART RATE: 97 BPM | WEIGHT: 179.02 LBS

## 2025-06-16 VITALS
OXYGEN SATURATION: 100 % | RESPIRATION RATE: 17 BRPM | SYSTOLIC BLOOD PRESSURE: 133 MMHG | DIASTOLIC BLOOD PRESSURE: 58 MMHG | HEART RATE: 65 BPM

## 2025-06-16 DIAGNOSIS — K63.3 ULCER OF INTESTINE: ICD-10-CM

## 2025-06-16 DIAGNOSIS — Z98.890 OTHER SPECIFIED POSTPROCEDURAL STATES: Chronic | ICD-10-CM

## 2025-06-16 DIAGNOSIS — Z96.652 PRESENCE OF LEFT ARTIFICIAL KNEE JOINT: Chronic | ICD-10-CM

## 2025-06-16 PROCEDURE — C1889: CPT

## 2025-06-16 PROCEDURE — 88305 TISSUE EXAM BY PATHOLOGIST: CPT

## 2025-06-16 PROCEDURE — 45380 COLONOSCOPY AND BIOPSY: CPT

## 2025-06-16 PROCEDURE — 45382 COLONOSCOPY W/CONTROL BLEED: CPT | Mod: 59

## 2025-06-16 PROCEDURE — 88305 TISSUE EXAM BY PATHOLOGIST: CPT | Mod: 26

## 2025-06-16 DEVICE — NET RETRV ROT ROTH 2.5MMX230CM: Type: IMPLANTABLE DEVICE | Status: FUNCTIONAL

## 2025-06-16 DEVICE — CLIP RESOLUTION 360 235CM: Type: IMPLANTABLE DEVICE | Status: FUNCTIONAL

## 2025-06-16 RX ORDER — ATORVASTATIN CALCIUM 80 MG/1
1 TABLET, FILM COATED ORAL
Refills: 0 | DISCHARGE

## 2025-06-16 RX ORDER — ACETAMINOPHEN 500 MG/5ML
1000 LIQUID (ML) ORAL ONCE
Refills: 0 | Status: COMPLETED | OUTPATIENT
Start: 2025-06-16 | End: 2025-06-16

## 2025-06-16 RX ORDER — ACETAMINOPHEN 500 MG/5ML
1000 LIQUID (ML) ORAL ONCE
Refills: 0 | Status: DISCONTINUED | OUTPATIENT
Start: 2025-06-16 | End: 2025-06-30

## 2025-06-16 RX ADMIN — Medication 400 MILLIGRAM(S): at 12:53

## 2025-06-16 NOTE — ASU DISCHARGE PLAN (ADULT/PEDIATRIC) - FINANCIAL ASSISTANCE
[facility] provides services at a reduced cost to those who are determined to be eligible through [facility]’s financial assistance program. Information regarding [facility]’s financial assistance program can be found by going to [email or web address] or by calling [phone].

## 2025-06-16 NOTE — ASU DISCHARGE PLAN (ADULT/PEDIATRIC) - ASU DC SPECIAL INSTRUCTIONSFT
Please call Dr. Ngo's office next week for pathology and to arrange follow-up. The office phone number is [phone].

## 2025-06-16 NOTE — PRE PROCEDURE NOTE - PRE PROCEDURE EVALUATION
Attending Physician: Dr. Ngo                           Procedure: Colonoscopy     Indication for Procedure: stercoral ulcer  ________________________________________________________  PAST MEDICAL & SURGICAL HISTORY:  Acute asthma exacerbation      H/O total knee replacement, left  2016      History of colon surgery        ALLERGIES:  No Known Allergies    HOME MEDICATIONS:    AICD/PPM: [ ] yes   [ ] no    PERTINENT LAB DATA:                      PHYSICAL EXAMINATION:    T(C): --  HR: --  BP: --  RR: --  SpO2: --    Constitutional: NAD  HEENT: PERRLA, EOMI,    Neck:  No JVD  Respiratory: CTAB/L  Cardiovascular: S1 and S2  Gastrointestinal: BS+, soft, NT/ND  Extremities: No peripheral edema  Neurological: A/O x 3, no focal deficits  Psychiatric: Normal mood, normal affect  Skin: No rashes    ASA Class: I [ ]  II [ ]  III [ ]  IV [ ]    COMMENTS:    The patient is a suitable candidate for the planned procedure unless box checked [ ]  No, explain:     Azithromycin Pregnancy And Lactation Text: This medication is considered safe during pregnancy and is also secreted in breast milk.

## 2025-06-16 NOTE — CHART NOTE - NSCHARTNOTEFT_GEN_A_CORE
Pt states that she has redness and pain in her left eye. MD evaluated patient at bedside. The patient was visibly upset, but agreed to flushing the irritated eye. She states that the eye felt better, but still "scratchy". Discussed with the patient that this is most likely a corneal abrasion. The patient has requested an ophthalmology consult, which was called. The ophthalmologist explained that there is a corneal abrasion protocol, and that because this is not an emergency, the patient will most likely have to wait upwards of 6 hours before being seen. This was explained to the patient, and the patient was compliant with tetracaine drops. She felt immediate relief. Tobramycin drop was then administered, with additional directions for home. Outpatient ophthalmology follow up phone number/contact was given to the patient. All questions answered, including when to come to the emergency room.    Denise Au MD  Department of Anesthesiology.

## 2025-06-18 LAB — SURGICAL PATHOLOGY STUDY: SIGNIFICANT CHANGE UP

## 2025-06-19 ENCOUNTER — NON-APPOINTMENT (OUTPATIENT)
Age: 70
End: 2025-06-19

## (undated) DEVICE — CATH IV SAFE BC 22G X 1" (BLUE)

## (undated) DEVICE — TUBING SUCTION CONN 6FT STERILE

## (undated) DEVICE — BRUSH COLONOSCOPY CYTOLOGY

## (undated) DEVICE — BIOPSY FORCEP RADIAL JAW 4 STANDARD WITH NEEDLE

## (undated) DEVICE — CATH IV SAFE BC 20G X 1.16" (PINK)

## (undated) DEVICE — POLY TRAP ETRAP

## (undated) DEVICE — ELCTR GROUNDING PAD ADULT COVIDIEN

## (undated) DEVICE — SENSOR O2 FINGER ADULT

## (undated) DEVICE — SUCTION YANKAUER NO CONTROL VENT

## (undated) DEVICE — FOLEY HOLDER STATLOCK 2 WAY ADULT

## (undated) DEVICE — TUBING IV SET GRAVITY 3Y 100" MACRO

## (undated) DEVICE — SENSOR O2 FINGER ADULT 24/CA

## (undated) DEVICE — PACK IV START WITH CHG

## (undated) DEVICE — SYR LUER LOK 50CC

## (undated) DEVICE — CLAMP BX HOT RAD JAW 3

## (undated) DEVICE — SOL INJ NS 0.9% 500ML 2 PORT

## (undated) DEVICE — TUBING SUCTION 20FT

## (undated) DEVICE — IRRIGATOR BIO SHIELD

## (undated) DEVICE — FORCEP RADIAL JAW 4 JUMBO 2.8MM 3.2MM 240CM ORANGE DISP